# Patient Record
Sex: FEMALE | Race: ASIAN | NOT HISPANIC OR LATINO | Employment: UNEMPLOYED | URBAN - METROPOLITAN AREA
[De-identification: names, ages, dates, MRNs, and addresses within clinical notes are randomized per-mention and may not be internally consistent; named-entity substitution may affect disease eponyms.]

---

## 2020-03-03 ENCOUNTER — OFFICE VISIT (OUTPATIENT)
Dept: RETAIL CLINIC | Facility: CLINIC | Age: 35
End: 2020-03-03

## 2020-03-03 VITALS
TEMPERATURE: 98.1 F | OXYGEN SATURATION: 98 % | RESPIRATION RATE: 14 BRPM | SYSTOLIC BLOOD PRESSURE: 100 MMHG | HEIGHT: 63 IN | BODY MASS INDEX: 22.68 KG/M2 | WEIGHT: 128 LBS | DIASTOLIC BLOOD PRESSURE: 76 MMHG | HEART RATE: 95 BPM

## 2020-03-03 DIAGNOSIS — H10.33 ACUTE BACTERIAL CONJUNCTIVITIS OF BOTH EYES: Primary | ICD-10-CM

## 2020-03-03 PROCEDURE — 99203 OFFICE O/P NEW LOW 30 MIN: CPT | Performed by: NURSE PRACTITIONER

## 2020-03-03 RX ORDER — POLYMYXIN B SULFATE AND TRIMETHOPRIM 1; 10000 MG/ML; [USP'U]/ML
1 SOLUTION OPHTHALMIC EVERY 6 HOURS
Qty: 10 ML | Refills: 0 | Status: SHIPPED | OUTPATIENT
Start: 2020-03-03 | End: 2020-03-10

## 2020-03-03 RX ORDER — AZITHROMYCIN 250 MG/1
250 TABLET, FILM COATED ORAL DAILY
COMMUNITY
Start: 2020-01-03 | End: 2020-03-03

## 2020-03-03 RX ORDER — AZITHROMYCIN 200 MG/5ML
POWDER, FOR SUSPENSION ORAL
COMMUNITY
Start: 2020-02-26 | End: 2020-03-03

## 2020-03-03 NOTE — PROGRESS NOTES
"Jamaal Ibarra is a 34 y.o. female.   Chief Complaint   Patient presents with   • Eye Problem      35 yo female presents with complaint of awaking with red eyes, purulent drainage, and gradually worsening over past 5 days.  She took a zpak that she finished 2 d ago.  She reports having it for an \"emergency\".  Refer to HPI/ROS for additional information.    Eye Problem    Both eyes are affected.This is a new problem. The problem has been gradually worsening. There was no injury mechanism. The patient is experiencing no pain. There is known exposure to pink eye. She does not wear contacts. Associated symptoms include an eye discharge and eye redness. Pertinent negatives include no blurred vision, double vision, fever, foreign body sensation or nausea. Treatments tried: antibiotic. The treatment provided no relief.        The following portions of the patient's history were reviewed and updated as appropriate: allergies, current medications, past family history, past medical history, past social history, past surgical history and problem list.    Current Outpatient Medications:   •  trimethoprim-polymyxin b (POLYTRIM) 98604-6.1 UNIT/ML-% ophthalmic solution, Administer 1 drop to both eyes Every 6 (Six) Hours for 7 days., Disp: 10 mL, Rfl: 0    Review of Systems   Constitutional: Negative.  Negative for fever.   HENT: Negative.    Eyes: Positive for discharge and redness. Negative for blurred vision and double vision.   Respiratory: Negative.    Cardiovascular: Negative.    Gastrointestinal: Negative.  Negative for nausea.   Skin: Negative.    Psychiatric/Behavioral: Negative.      /76 (BP Location: Left arm, Patient Position: Sitting)   Pulse 95   Temp 98.1 °F (36.7 °C) (Oral)   Resp 14   Ht 160 cm (63\")   Wt 58.1 kg (128 lb)   SpO2 98%   BMI 22.67 kg/m²     Objective   No Known Allergies    Physical Exam   Constitutional: She is oriented to person, place, and time.   HENT:   Head: " Normocephalic.   Right Ear: Hearing, tympanic membrane, external ear and ear canal normal.   Left Ear: Hearing, tympanic membrane, external ear and ear canal normal.   Nose: Nose normal. Right sinus exhibits no maxillary sinus tenderness and no frontal sinus tenderness. Left sinus exhibits no maxillary sinus tenderness and no frontal sinus tenderness.   Mouth/Throat: Uvula is midline, oropharynx is clear and moist and mucous membranes are normal. Tonsils are 0 on the right. Tonsils are 0 on the left. No tonsillar exudate.   Eyes: Pupils are equal, round, and reactive to light. EOM and lids are normal. Right conjunctiva is injected. Left conjunctiva is injected.   Neck: Normal range of motion. Neck supple. No JVD present. No tracheal deviation present. No thyromegaly present.   Cardiovascular: Normal rate, regular rhythm and normal heart sounds.   Pulmonary/Chest: Effort normal and breath sounds normal. No stridor. No respiratory distress. She has no wheezes. She has no rales. She exhibits no tenderness.   Lymphadenopathy:     She has no cervical adenopathy.   Neurological: She is alert and oriented to person, place, and time.   Skin: Skin is warm. Capillary refill takes less than 2 seconds.   Psychiatric: She has a normal mood and affect. Her behavior is normal. Judgment and thought content normal.   Vitals reviewed.      Assessment/Plan   Rahel was seen today for eye problem.    Diagnoses and all orders for this visit:    Acute bacterial conjunctivitis of both eyes    Other orders  -     trimethoprim-polymyxin b (POLYTRIM) 16359-7.1 UNIT/ML-% ophthalmic solution; Administer 1 drop to both eyes Every 6 (Six) Hours for 7 days.            An After Visit Summary was printed, reviewed, and given to the patient. Understanding verbalized and agrees with treatment plan.  If no improvement or becomes worse, follow up with primary or go to [facility]/ER.          March 3, 2020 6:42 PM

## 2020-03-03 NOTE — PATIENT INSTRUCTIONS
Bacterial Conjunctivitis, Adult  Bacterial conjunctivitis is an infection of your conjunctiva. This is the clear membrane that covers the white part of your eye and the inner part of your eyelid. This infection can make your eye:  · Red or pink.  · Itchy.  This condition spreads easily from person to person (is contagious) and from one eye to the other eye.  What are the causes?  · This condition is caused by germs (bacteria). You may get the infection if you come into close contact with:  ? A person who has the infection.  ? Items that have germs on them (are contaminated), such as face towels, contact lens solution, or eye makeup.  What increases the risk?  You are more likely to get this condition if you:  · Have contact with people who have the infection.  · Wear contact lenses.  · Have a sinus infection.  · Have had a recent eye injury or surgery.  · Have a weak body defense system (immune system).  · Have dry eyes.  What are the signs or symptoms?    · Thick, yellowish discharge from the eye.  · Tearing or watery eyes.  · Itchy eyes.  · Burning feeling in your eyes.  · Eye redness.  · Swollen eyelids.  · Blurred vision.  How is this treated?    · Antibiotic eye drops or ointment.  · Antibiotic medicine taken by mouth. This is used for infections that do not get better with drops or ointment or that last more than 10 days.  · Cool, wet cloths placed on the eyes.  · Artificial tears used 2-6 times a day.  Follow these instructions at home:  Medicines  · Take or apply your antibiotic medicine as told by your doctor. Do not stop taking or applying the antibiotic even if you start to feel better.  · Take or apply over-the-counter and prescription medicines only as told by your doctor.  · Do not touch your eyelid with the eye-drop bottle or the ointment tube.  Managing discomfort  · Wipe any fluid from your eye with a warm, wet washcloth or a cotton ball.  · Place a clean, cool, wet cloth on your eye. Do this for  10-20 minutes, 3-4 times per day.  General instructions  · Do not wear contacts until the infection is gone. Wear glasses until your doctor says it is okay to wear contacts again.  · Do not wear eye makeup until the infection is gone. Throw away old eye makeup.  · Change or wash your pillowcase every day.  · Do not share towels or washcloths.  · Wash your hands often with soap and water. Use paper towels to dry your hands.  · Do not touch or rub your eyes.  · Do not drive or use heavy machinery if your vision is blurred.  Contact a doctor if:  · You have a fever.  · You do not get better after 10 days.  Get help right away if:  · You have a fever and your symptoms get worse all of a sudden.  · You have very bad pain when you move your eye.  · Your face:  ? Hurts.  ? Is red.  ? Is swollen.  · You have sudden loss of vision.  Summary  · Bacterial conjunctivitis is an infection of your conjunctiva.  · This infection spreads easily from person to person.  · Wash your hands often with soap and water. Use paper towels to dry your hands.  · Take or apply your antibiotic medicine as told by your doctor.  · Contact a doctor if you have a fever or you do not get better after 10 days.  This information is not intended to replace advice given to you by your health care provider. Make sure you discuss any questions you have with your health care provider.  Document Released: 09/26/2009 Document Revised: 07/24/2019 Document Reviewed: 07/24/2019  [employer] Interactive Patient Education © 2020 [employer]

## 2021-11-10 ENCOUNTER — OFFICE VISIT (OUTPATIENT)
Dept: INTERNAL MEDICINE | Facility: CLINIC | Age: 36
End: 2021-11-10

## 2021-11-10 ENCOUNTER — LAB (OUTPATIENT)
Dept: LAB | Facility: HOSPITAL | Age: 36
End: 2021-11-10

## 2021-11-10 VITALS
WEIGHT: 155 LBS | HEIGHT: 63 IN | TEMPERATURE: 97.2 F | HEART RATE: 62 BPM | BODY MASS INDEX: 27.46 KG/M2 | SYSTOLIC BLOOD PRESSURE: 116 MMHG | OXYGEN SATURATION: 99 % | DIASTOLIC BLOOD PRESSURE: 60 MMHG

## 2021-11-10 DIAGNOSIS — R07.89 OTHER CHEST PAIN: ICD-10-CM

## 2021-11-10 DIAGNOSIS — H69.83 EUSTACHIAN TUBE DYSFUNCTION, BILATERAL: ICD-10-CM

## 2021-11-10 DIAGNOSIS — G44.89 OTHER HEADACHE SYNDROME: ICD-10-CM

## 2021-11-10 DIAGNOSIS — R10.9 FLANK PAIN: Primary | ICD-10-CM

## 2021-11-10 DIAGNOSIS — R06.02 SHORTNESS OF BREATH: ICD-10-CM

## 2021-11-10 DIAGNOSIS — R07.89 OTHER CHEST PAIN: Primary | ICD-10-CM

## 2021-11-10 DIAGNOSIS — J30.89 NON-SEASONAL ALLERGIC RHINITIS DUE TO OTHER ALLERGIC TRIGGER: ICD-10-CM

## 2021-11-10 DIAGNOSIS — R31.29 OTHER MICROSCOPIC HEMATURIA: ICD-10-CM

## 2021-11-10 LAB
BASOPHILS # BLD AUTO: 0.06 10*3/MM3 (ref 0–0.2)
BASOPHILS NFR BLD AUTO: 0.8 % (ref 0–1.5)
BILIRUB BLD-MCNC: NEGATIVE MG/DL
CLARITY, POC: CLEAR
COLOR UR: YELLOW
DEPRECATED RDW RBC AUTO: 41.1 FL (ref 37–54)
EOSINOPHIL # BLD AUTO: 0.16 10*3/MM3 (ref 0–0.4)
EOSINOPHIL NFR BLD AUTO: 2 % (ref 0.3–6.2)
ERYTHROCYTE [DISTWIDTH] IN BLOOD BY AUTOMATED COUNT: 13.1 % (ref 12.3–15.4)
EXPIRATION DATE: ABNORMAL
GLUCOSE UR STRIP-MCNC: NEGATIVE MG/DL
HCT VFR BLD AUTO: 37.2 % (ref 34–46.6)
HGB BLD-MCNC: 12.4 G/DL (ref 12–15.9)
IMM GRANULOCYTES # BLD AUTO: 0.02 10*3/MM3 (ref 0–0.05)
IMM GRANULOCYTES NFR BLD AUTO: 0.3 % (ref 0–0.5)
KETONES UR QL: NEGATIVE
LEUKOCYTE EST, POC: NEGATIVE
LYMPHOCYTES # BLD AUTO: 2.13 10*3/MM3 (ref 0.7–3.1)
LYMPHOCYTES NFR BLD AUTO: 26.8 % (ref 19.6–45.3)
Lab: ABNORMAL
MCH RBC QN AUTO: 28.5 PG (ref 26.6–33)
MCHC RBC AUTO-ENTMCNC: 33.3 G/DL (ref 31.5–35.7)
MCV RBC AUTO: 85.5 FL (ref 79–97)
MONOCYTES # BLD AUTO: 0.75 10*3/MM3 (ref 0.1–0.9)
MONOCYTES NFR BLD AUTO: 9.4 % (ref 5–12)
NEUTROPHILS NFR BLD AUTO: 4.83 10*3/MM3 (ref 1.7–7)
NEUTROPHILS NFR BLD AUTO: 60.7 % (ref 42.7–76)
NITRITE UR-MCNC: NEGATIVE MG/ML
NRBC BLD AUTO-RTO: 0 /100 WBC (ref 0–0.2)
PH UR: 6 [PH] (ref 5–8)
PLATELET # BLD AUTO: 380 10*3/MM3 (ref 140–450)
PMV BLD AUTO: 10.5 FL (ref 6–12)
PROT UR STRIP-MCNC: NEGATIVE MG/DL
RBC # BLD AUTO: 4.35 10*6/MM3 (ref 3.77–5.28)
RBC # UR STRIP: ABNORMAL /UL
SP GR UR: 1.01 (ref 1–1.03)
UROBILINOGEN UR QL: NORMAL
WBC # BLD AUTO: 7.95 10*3/MM3 (ref 3.4–10.8)

## 2021-11-10 PROCEDURE — 99214 OFFICE O/P EST MOD 30 MIN: CPT | Performed by: NURSE PRACTITIONER

## 2021-11-10 PROCEDURE — 85025 COMPLETE CBC W/AUTO DIFF WBC: CPT

## 2021-11-10 PROCEDURE — 36415 COLL VENOUS BLD VENIPUNCTURE: CPT

## 2021-11-10 PROCEDURE — 80053 COMPREHEN METABOLIC PANEL: CPT | Performed by: NURSE PRACTITIONER

## 2021-11-10 RX ORDER — LORATADINE 10 MG/1
10 TABLET ORAL DAILY
Qty: 30 TABLET | Refills: 3 | Status: SHIPPED | OUTPATIENT
Start: 2021-11-10 | End: 2022-09-26

## 2021-11-10 NOTE — PROGRESS NOTES
"Subjective   Chief Complaint   Patient presents with   • Establish Care     right abdominal pain       Rahel Ibarra is a 36 y.o. female here today as a new patient to establish care.  Has not had a PCP.   Pt complains of pain on her right side.  The pain is intermittent.  She feels the pain every night.   The pain started about a month ago.   Finished her period yesterday.  Denies any nausea, vomiting or diarrhea.  At this time pt complains of a \"tightness\" in her side.  Pt also complains of headaches.  These occur every 2-3 days.  Tylenol helps the symptoms.  Hasn't had an eye exam for a few years.  Requesting that her ears be checked.  \"Feel full\" and popping  Has been having chest pain with activity.  Feels SOB at times with working or running.  This has occurred 3-4 times the past 3 months or so.  Lasts about 20-30 minutes.  Denies any chest pain at this time.  Last episode 3-4 days ago.  Started after she had her son about 3 months ago.    Pt is breast feeding.         Review of Systems   Constitutional: Negative for activity change, appetite change and fatigue.   HENT: Positive for ear pain (bilateral). Negative for congestion.    Respiratory: Positive for shortness of breath. Negative for cough.    Cardiovascular: Positive for chest pain. Negative for leg swelling.   Gastrointestinal: Positive for abdominal pain (Right side).   Neurological: Positive for headache. Negative for dizziness, weakness and confusion.   Psychiatric/Behavioral: Negative for behavioral problems and decreased concentration.       History reviewed. No pertinent past medical history.  History reviewed. No pertinent surgical history.  Family History   Problem Relation Age of Onset   • Heart disease Mother    • No Known Problems Father      Social History     Tobacco Use   Smoking Status Never Smoker   Smokeless Tobacco Never Used      Social History     Substance and Sexual Activity   Alcohol Use Not Currently      No current outpatient " "medications on file prior to visit.     No current facility-administered medications on file prior to visit.     No Known Allergies    Objective   Vitals:    11/10/21 1504   BP: 116/60   Pulse: 62   Temp: 97.2 °F (36.2 °C)   TempSrc: Temporal   SpO2: 99%   Weight: 70.3 kg (155 lb)   Height: 160 cm (63\")     Body mass index is 27.46 kg/m².    Physical Exam  Vitals and nursing note reviewed.   HENT:      Head: Normocephalic.      Right Ear: Hearing, ear canal and external ear normal.      Left Ear: Hearing, ear canal and external ear normal.      Ears:      Comments: Both TMs dull with fluid behind them, no redness  Eyes:      Pupils: Pupils are equal, round, and reactive to light.   Cardiovascular:      Rate and Rhythm: Normal rate and regular rhythm.      Pulses: Normal pulses.      Heart sounds: Normal heart sounds. No murmur heard.      Pulmonary:      Effort: Pulmonary effort is normal.      Breath sounds: Normal breath sounds. No wheezing, rhonchi or rales.   Abdominal:      General: Bowel sounds are normal. There is no distension.      Palpations: Abdomen is soft.      Tenderness: There is no abdominal tenderness. There is no right CVA tenderness or left CVA tenderness.   Skin:     General: Skin is warm and dry.      Capillary Refill: Capillary refill takes less than 2 seconds.   Neurological:      General: No focal deficit present.      Mental Status: She is alert and oriented to person, place, and time.      Gait: Gait is intact.   Psychiatric:         Attention and Perception: Attention normal.         Mood and Affect: Mood normal.         Behavior: Behavior normal.              Assessment/Plan   Problem List Items Addressed This Visit     None      Visit Diagnoses     Flank pain    -  Primary    Relevant Orders    POCT urinalysis dipstick, automated (Completed)    US Renal Limited    Other headache syndrome        Non-seasonal allergic rhinitis due to other allergic trigger        Relevant Medications    " loratadine (Claritin) 10 MG tablet    Eustachian tube dysfunction, bilateral        Other chest pain        Relevant Orders    CBC w AUTO Differential    Comprehensive Metabolic Panel    Other microscopic hematuria        Relevant Orders    US Renal Limited    Shortness of breath        Relevant Orders    D-dimer, Quantitative         Loratadine for allergies  Schedule U/S to look for renal colic  Denies CP today  Labs, d-dimer  Recommend eye exam for headaches    Current Outpatient Medications:   •  loratadine (Claritin) 10 MG tablet, Take 1 tablet by mouth Daily., Disp: 30 tablet, Rfl: 3       Plan of care reviewed with the patient at the conclusion of today's visit.  Education was provided regarding diagnosis, management, and any prescribed or recommended OTC medications.  Patient verbalized understanding of and agreement with management plan.     Return in about 4 weeks (around 12/8/2021) for Recheck allergies, side pain, CP.        TRES Borrero

## 2021-11-11 LAB
ALBUMIN SERPL-MCNC: 4.8 G/DL (ref 3.5–5.2)
ALBUMIN/GLOB SERPL: 2 G/DL
ALP SERPL-CCNC: 96 U/L (ref 39–117)
ALT SERPL W P-5'-P-CCNC: 7 U/L (ref 1–33)
ANION GAP SERPL CALCULATED.3IONS-SCNC: 8.1 MMOL/L (ref 5–15)
AST SERPL-CCNC: 13 U/L (ref 1–32)
BILIRUB SERPL-MCNC: <0.2 MG/DL (ref 0–1.2)
BUN SERPL-MCNC: 10 MG/DL (ref 6–20)
BUN/CREAT SERPL: 15.6 (ref 7–25)
CALCIUM SPEC-SCNC: 9.6 MG/DL (ref 8.6–10.5)
CHLORIDE SERPL-SCNC: 106 MMOL/L (ref 98–107)
CO2 SERPL-SCNC: 24.9 MMOL/L (ref 22–29)
CREAT SERPL-MCNC: 0.64 MG/DL (ref 0.57–1)
GFR SERPL CREATININE-BSD FRML MDRD: 106 ML/MIN/1.73
GLOBULIN UR ELPH-MCNC: 2.4 GM/DL
GLUCOSE SERPL-MCNC: 95 MG/DL (ref 65–99)
POTASSIUM SERPL-SCNC: 4.3 MMOL/L (ref 3.5–5.2)
PROT SERPL-MCNC: 7.2 G/DL (ref 6–8.5)
SODIUM SERPL-SCNC: 139 MMOL/L (ref 136–145)

## 2021-11-22 ENCOUNTER — HOSPITAL ENCOUNTER (OUTPATIENT)
Dept: ULTRASOUND IMAGING | Facility: HOSPITAL | Age: 36
Discharge: HOME | End: 2021-11-22
Admitting: NURSE PRACTITIONER

## 2021-11-22 DIAGNOSIS — R31.29 OTHER MICROSCOPIC HEMATURIA: ICD-10-CM

## 2021-11-22 DIAGNOSIS — R10.9 FLANK PAIN: ICD-10-CM

## 2021-11-22 PROCEDURE — 76775 US EXAM ABDO BACK WALL LIM: CPT

## 2021-11-29 DIAGNOSIS — R10.30 LOWER ABDOMINAL PAIN: Primary | ICD-10-CM

## 2021-12-08 ENCOUNTER — HOSPITAL ENCOUNTER (OUTPATIENT)
Dept: ULTRASOUND IMAGING | Facility: HOSPITAL | Age: 36
Discharge: HOME | End: 2021-12-08
Admitting: NURSE PRACTITIONER

## 2021-12-08 DIAGNOSIS — R10.30 LOWER ABDOMINAL PAIN: ICD-10-CM

## 2021-12-08 PROCEDURE — 76830 TRANSVAGINAL US NON-OB: CPT

## 2021-12-13 DIAGNOSIS — D25.9 UTERINE LEIOMYOMA, UNSPECIFIED LOCATION: Primary | ICD-10-CM

## 2021-12-13 DIAGNOSIS — N83.299 FUNCTIONAL OVARIAN CYSTS: ICD-10-CM

## 2021-12-16 ENCOUNTER — OFFICE VISIT (OUTPATIENT)
Dept: INTERNAL MEDICINE | Facility: CLINIC | Age: 36
End: 2021-12-16

## 2021-12-16 VITALS
BODY MASS INDEX: 27 KG/M2 | WEIGHT: 152.4 LBS | OXYGEN SATURATION: 97 % | TEMPERATURE: 97.3 F | HEART RATE: 97 BPM | DIASTOLIC BLOOD PRESSURE: 64 MMHG | HEIGHT: 63 IN | SYSTOLIC BLOOD PRESSURE: 122 MMHG

## 2021-12-16 DIAGNOSIS — G89.29 CHRONIC RIGHT SHOULDER PAIN: ICD-10-CM

## 2021-12-16 DIAGNOSIS — R10.9 FLANK PAIN: Primary | ICD-10-CM

## 2021-12-16 DIAGNOSIS — M25.511 CHRONIC RIGHT SHOULDER PAIN: ICD-10-CM

## 2021-12-16 DIAGNOSIS — M79.621 PAIN IN RIGHT UPPER ARM: ICD-10-CM

## 2021-12-16 PROCEDURE — 99214 OFFICE O/P EST MOD 30 MIN: CPT | Performed by: NURSE PRACTITIONER

## 2021-12-16 NOTE — PROGRESS NOTES
"Chief Complaint   Patient presents with   • Flank Pain     f/u        HPI  Rahel Ibarra is a 36 y.o. female presents for follow-up on flank pain.  She states the pain has resolved.  She had a negative renal U/S but a uterine fibroid was detected.  She has been referred to a gynecologist and has an upcoming appt.  Pt does complain of right shoulder pain.  This started last week.  She used topical analgesics last week and the pain resolved.  She denies any pain at this time.  She denies having any swelling of the shoulder or upper arm.  She states the upper arm feels \"very heavy\" when she has the pain.  Denies this feeling at this time.  She states she had similar pain prior to delivering to have her baby.    The following portions of the patient's history were reviewed and updated as appropriate: allergies, current medications, past family history, past medical history, past social history, past surgical history and problem list.    Subjective  Review of Systems   Constitutional: Negative for activity change, appetite change and fatigue.   HENT: Negative for congestion.    Respiratory: Negative for cough and shortness of breath.    Cardiovascular: Negative for chest pain and leg swelling.   Gastrointestinal: Negative for abdominal pain.   Musculoskeletal: Positive for arthralgias (Right upper arm/shoulder).   Neurological: Negative for dizziness, weakness and confusion.   Psychiatric/Behavioral: Negative for behavioral problems and decreased concentration.       Objective  Visit Vitals  /64   Pulse 97   Temp 97.3 °F (36.3 °C) (Temporal)   Ht 160 cm (63\")   Wt 69.1 kg (152 lb 6.4 oz)   LMP 12/07/2021   SpO2 97%   BMI 27.00 kg/m²        Physical Exam  Vitals and nursing note reviewed.   HENT:      Head: Normocephalic.   Eyes:      Pupils: Pupils are equal, round, and reactive to light.   Cardiovascular:      Rate and Rhythm: Normal rate and regular rhythm.      Pulses: Normal pulses.      Heart sounds: Normal " heart sounds.   Pulmonary:      Effort: Pulmonary effort is normal.      Breath sounds: Normal breath sounds.   Musculoskeletal:      Right shoulder: Normal.   Skin:     General: Skin is warm and dry.      Capillary Refill: Capillary refill takes less than 2 seconds.   Neurological:      General: No focal deficit present.      Mental Status: She is alert and oriented to person, place, and time.      Gait: Gait is intact.   Psychiatric:         Attention and Perception: Attention normal.         Mood and Affect: Mood normal.         Behavior: Behavior normal.          Procedures      Assessment and Plan  Diagnoses and all orders for this visit:    1. Flank pain (Primary)    2. Chronic right shoulder pain  -     XR Shoulder 2+ View Right; Future    3. Pain in right upper arm  -     XR Humerus Right; Future    flank pain has resolved  Denies pain at this time  XR R shoulder/humerus per pt request    Return if symptoms worsen or fail to improve.          TRES Borrero

## 2022-02-20 PROBLEM — Z01.419 WELL WOMAN EXAM: Status: ACTIVE | Noted: 2022-02-20

## 2022-02-24 ENCOUNTER — OFFICE VISIT (OUTPATIENT)
Dept: OBSTETRICS AND GYNECOLOGY | Facility: CLINIC | Age: 37
End: 2022-02-24

## 2022-02-24 VITALS — WEIGHT: 156 LBS | RESPIRATION RATE: 14 BRPM | BODY MASS INDEX: 27.63 KG/M2

## 2022-02-24 DIAGNOSIS — D25.2 SUBSEROUS LEIOMYOMA OF UTERUS: Primary | ICD-10-CM

## 2022-02-24 DIAGNOSIS — N92.6 IRREGULAR MENSES: ICD-10-CM

## 2022-02-24 PROCEDURE — 99203 OFFICE O/P NEW LOW 30 MIN: CPT | Performed by: OBSTETRICS & GYNECOLOGY

## 2022-02-24 RX ORDER — NORETHINDRONE ACETATE AND ETHINYL ESTRADIOL 1MG-20(21)
1 KIT ORAL DAILY
Qty: 28 TABLET | Refills: 5 | Status: SHIPPED | OUTPATIENT
Start: 2022-02-24 | End: 2022-08-21 | Stop reason: SDUPTHER

## 2022-02-24 NOTE — PATIENT INSTRUCTIONS
Uterine Fibroids    Uterine fibroids are lumps of tissue (tumors) in your womb (uterus). They are not cancer (are benign).  Most women with this condition do not need treatment. Sometimes fibroids can affect your ability to have children (your fertility). If that happens, you may need surgery to take out the fibroids.  Follow these instructions at home:  · Take over-the-counter and prescription medicines only as told by your doctor. Your doctor may suggest NSAIDs (such as aspirin or ibuprofen) to help with pain.  · Ask your doctor if you should:  ? Take iron pills.  ? Eat more foods that have iron in them, such as dark green, leafy vegetables.  · If directed, apply heat to your back or belly to reduce pain. Use the heat source that your doctor recommends, such as a moist heat pack or a heating pad.  ? Put a towel between your skin and the heat source.  ? Leave the heat on for 20-30 minutes.  ? Remove the heat if your skin turns bright red. This is especially important if you are unable to feel pain, heat, or cold. You may have a greater risk of getting burned.  · Pay close attention to your period (menstrual) cycles. Tell your doctor about any changes, such as:  ? A heavier blood flow than usual.  ? Needing to use more pads or tampons than normal.  ? A change in how many days your period lasts.  ? A change in symptoms that come with your period, such as cramps or back pain.  · Keep all follow-up visits as told by your doctor. This is important. Your doctor may need to watch your fibroids over time for any changes.  Contact a doctor if you:  · Have pain that does not get better with medicine or heat, such as pain or cramps in:  ? Your back.  ? The area between your hip bones (pelvic area).  ? Your belly.  · Have new bleeding between your periods.  · Have more bleeding during or between your periods.  · Feel very tired or weak.  · Feel light-headed.  Get help right away if you:  · Pass out (faint).  · Have pain in the  area between your hip bones that suddenly gets worse.  · Have bleeding that soaks a tampon or pad in 30 minutes or less.  Summary  · Uterine fibroids are lumps of tissue (tumors) in your womb (uterus). They are not cancer.  · The only treatment that most women need is taking aspirin or ibuprofen for pain.  · Contact a doctor if you have pain or cramps that do not get better with medicine.  · Make sure you know what symptoms you should get help for right away.  This information is not intended to replace advice given to you by your health care provider. Make sure you discuss any questions you have with your health care provider.  Document Revised: 11/30/2018 Document Reviewed: 11/13/2018  [employer] Patient Education © 2021 [employer] Inc.

## 2022-02-24 NOTE — PROGRESS NOTES
Subjective   Chief Complaint   Patient presents with   • Gynecologic Exam       Rahel Ibarra is a 36 y.o. year old .  Patient's last menstrual period was 2022 (approximate).  She comes in as a new patient referred by her primary care.  She comes in because of a finding of an ovarian cyst at a prior ultrasound.  She was having right-sided pain.  Because of this the primary care ordered an ultrasound.  Ultrasound images are available for review.  I reviewed the images.  Ovaries are actually normal.  She has a calcified fibroid at the fundus.  The endometrium does not appear to be disturbed.  She has been having irregular menses.  It is somewhat bothersome to her.  She is sexually active is in and using no reliable forms of contraception.  She never has used birth control because concerns about weight gain.    She believes she had a Pap smear done last year when she was being cared for at the [facility] around the time of the delivery of her third child.  This was a  section.    The following portions of the patient's history were reviewed and updated as appropriate:current medications, allergies, past family history, past medical history, past social history and past surgical history    Social History    Tobacco Use      Smoking status: Never Smoker      Smokeless tobacco: Never Used         Objective   Resp 14   Wt 70.8 kg (156 lb)   LMP 2022 (Approximate)   Breastfeeding No   BMI 27.63 kg/m²     Lab Review   No data reviewed    Imaging   See HPI       Assessment   1. Irregular menses  2. Intramural/subserosal fibroid without cavitary extension     Plan   1. She was instructed how to start her birth control.  It should be started on  following the onset of her next cycle.  Because it may take 1 month to become effective, the use of alternative contraception for one month was stressed.  The potential for breakthrough bleeding for up to 4 cycles was also  emphasized.  2. Ultrasound needs to be done in 3 months.   3. The following data needs to be obtained to update her medical records: last PAP and operative report from her C/S.  4. The importance of keeping all planned follow-up and taking all medications as prescribed was emphasized.  5. Follow up for recheck of fibroid 3 months    New Medications Ordered This Visit   Medications   • norethindrone-ethinyl estradiol FE (Junel FE 1/20) 1-20 MG-MCG per tablet     Sig: Take 1 tablet by mouth Daily.     Dispense:  28 tablet     Refill:  5          This note was electronically signed.    Jewel Gutierrez M.D.  February 24, 2022    Total time spent today with Rahel  was 30-44 minutes (level 3) - pathophysiology of her presenting problem along with plans for any diagnositc work-up and treatment.    Part of this note may be an electronic transcription/translation of spoken language to printed text using the Dragon Dictation System.

## 2022-02-25 ENCOUNTER — TELEPHONE (OUTPATIENT)
Dept: INTERNAL MEDICINE | Facility: CLINIC | Age: 37
End: 2022-02-25

## 2022-02-25 NOTE — TELEPHONE ENCOUNTER
Patient called complaining of left sided chest pain radiating into her left arm, advised patient to go to ER, patient wanted to get in with a cardiologist today, advised her that was not possible and she stated she would then go to the ER

## 2022-08-22 RX ORDER — NORETHINDRONE ACETATE AND ETHINYL ESTRADIOL 1MG-20(21)
1 KIT ORAL DAILY
Qty: 28 TABLET | Refills: 2 | Status: SHIPPED | OUTPATIENT
Start: 2022-08-22 | End: 2022-10-11

## 2022-08-29 ENCOUNTER — LAB (OUTPATIENT)
Dept: LAB | Facility: HOSPITAL | Age: 37
End: 2022-08-29

## 2022-08-29 ENCOUNTER — OFFICE VISIT (OUTPATIENT)
Dept: INTERNAL MEDICINE | Facility: CLINIC | Age: 37
End: 2022-08-29

## 2022-08-29 VITALS
DIASTOLIC BLOOD PRESSURE: 78 MMHG | WEIGHT: 155 LBS | SYSTOLIC BLOOD PRESSURE: 122 MMHG | HEART RATE: 68 BPM | HEIGHT: 64 IN | TEMPERATURE: 97.1 F | OXYGEN SATURATION: 99 % | BODY MASS INDEX: 26.46 KG/M2

## 2022-08-29 DIAGNOSIS — R00.2 PALPITATIONS: ICD-10-CM

## 2022-08-29 DIAGNOSIS — R07.89 INTERMITTENT LEFT-SIDED CHEST PAIN: Primary | ICD-10-CM

## 2022-08-29 DIAGNOSIS — Z82.49 FAMILY HISTORY OF HEART DISEASE: ICD-10-CM

## 2022-08-29 LAB
BASOPHILS # BLD AUTO: 0.07 10*3/MM3 (ref 0–0.2)
BASOPHILS NFR BLD AUTO: 1.1 % (ref 0–1.5)
D DIMER PPP FEU-MCNC: <0.27 MCGFEU/ML (ref 0.01–0.5)
DEPRECATED RDW RBC AUTO: 41.5 FL (ref 37–54)
EOSINOPHIL # BLD AUTO: 0.46 10*3/MM3 (ref 0–0.4)
EOSINOPHIL NFR BLD AUTO: 7.1 % (ref 0.3–6.2)
ERYTHROCYTE [DISTWIDTH] IN BLOOD BY AUTOMATED COUNT: 13.4 % (ref 12.3–15.4)
HCT VFR BLD AUTO: 41.4 % (ref 34–46.6)
HGB BLD-MCNC: 13.3 G/DL (ref 12–15.9)
IMM GRANULOCYTES # BLD AUTO: 0.02 10*3/MM3 (ref 0–0.05)
IMM GRANULOCYTES NFR BLD AUTO: 0.3 % (ref 0–0.5)
LYMPHOCYTES # BLD AUTO: 2.05 10*3/MM3 (ref 0.7–3.1)
LYMPHOCYTES NFR BLD AUTO: 31.6 % (ref 19.6–45.3)
MCH RBC QN AUTO: 27.1 PG (ref 26.6–33)
MCHC RBC AUTO-ENTMCNC: 32.1 G/DL (ref 31.5–35.7)
MCV RBC AUTO: 84.5 FL (ref 79–97)
MONOCYTES # BLD AUTO: 0.63 10*3/MM3 (ref 0.1–0.9)
MONOCYTES NFR BLD AUTO: 9.7 % (ref 5–12)
NEUTROPHILS NFR BLD AUTO: 3.25 10*3/MM3 (ref 1.7–7)
NEUTROPHILS NFR BLD AUTO: 50.2 % (ref 42.7–76)
NRBC BLD AUTO-RTO: 0 /100 WBC (ref 0–0.2)
PLATELET # BLD AUTO: 386 10*3/MM3 (ref 140–450)
PMV BLD AUTO: 10.4 FL (ref 6–12)
RBC # BLD AUTO: 4.9 10*6/MM3 (ref 3.77–5.28)
WBC NRBC COR # BLD: 6.48 10*3/MM3 (ref 3.4–10.8)

## 2022-08-29 PROCEDURE — 85025 COMPLETE CBC W/AUTO DIFF WBC: CPT | Performed by: NURSE PRACTITIONER

## 2022-08-29 PROCEDURE — 99214 OFFICE O/P EST MOD 30 MIN: CPT | Performed by: NURSE PRACTITIONER

## 2022-08-29 PROCEDURE — 85379 FIBRIN DEGRADATION QUANT: CPT | Performed by: NURSE PRACTITIONER

## 2022-08-29 PROCEDURE — 84443 ASSAY THYROID STIM HORMONE: CPT | Performed by: NURSE PRACTITIONER

## 2022-08-29 PROCEDURE — 36415 COLL VENOUS BLD VENIPUNCTURE: CPT | Performed by: NURSE PRACTITIONER

## 2022-08-29 PROCEDURE — 80053 COMPREHEN METABOLIC PANEL: CPT | Performed by: NURSE PRACTITIONER

## 2022-08-29 PROCEDURE — 93000 ELECTROCARDIOGRAM COMPLETE: CPT | Performed by: NURSE PRACTITIONER

## 2022-08-29 NOTE — PROGRESS NOTES
"Chief Complaint   Patient presents with   • Chest Pain       HPI  Rahel Ibarra is a 37 y.o. female presents for intermittent left side chest pain.  This has been occurring for a couple months.  Her last episode was about 3 weeks ago.  The pain usually last a few hours when it occurs.  Occurs at random times (not always exertional).  She has shortness of breath when the pain occurs.  She describes the pain as a tightness with occasional sharp pain and radiates to the left arm.  She states her arm gets heavy.  She denies feeling anxious.  Her mother  of a sudden heart attack in her early 50s.  She denies any diaphoresis or nausea.    No symptoms today.    The following portions of the patient's history were reviewed and updated as appropriate: allergies, current medications, past family history, past medical history, past social history, past surgical history and problem list.    Subjective  Review of Systems   Constitutional: Negative for activity change, appetite change and fatigue.   HENT: Negative for congestion.    Respiratory: Positive for chest tightness. Negative for cough and shortness of breath.    Cardiovascular: Positive for chest pain and palpitations. Negative for leg swelling.   Gastrointestinal: Negative for abdominal pain.   Neurological: Negative for dizziness, weakness and confusion.   Psychiatric/Behavioral: Negative for behavioral problems and decreased concentration.       Objective  Visit Vitals  /78 (BP Location: Left arm, Patient Position: Sitting)   Pulse 68   Temp 97.1 °F (36.2 °C)   Ht 162.6 cm (64\")   Wt 70.3 kg (155 lb)   SpO2 99%   BMI 26.61 kg/m²        Physical Exam  Vitals and nursing note reviewed.   HENT:      Head: Normocephalic.   Eyes:      Pupils: Pupils are equal, round, and reactive to light.   Cardiovascular:      Rate and Rhythm: Normal rate and regular rhythm.      Pulses: Normal pulses.      Heart sounds: Normal heart sounds. No murmur heard.    No friction rub. "   Pulmonary:      Effort: Pulmonary effort is normal.      Breath sounds: Normal breath sounds.   Skin:     General: Skin is warm and dry.      Capillary Refill: Capillary refill takes less than 2 seconds.   Neurological:      General: No focal deficit present.      Mental Status: She is alert and oriented to person, place, and time.      Gait: Gait is intact.   Psychiatric:         Attention and Perception: Attention normal.         Mood and Affect: Mood normal.         Behavior: Behavior normal.      .[facility]      ECG 12 Lead    Date/Time: 8/29/2022 11:40 AM  Performed by: Kuldip Beltran APRN  Authorized by: Kuldip Beltran APRN   Comparison: not compared with previous ECG   Previous ECG: no previous ECG available  Rhythm: sinus rhythm and sinus arrhythmia  Rate: normal  Conduction: conduction normal  ST Segments: ST segments normal  T Waves: T waves normal  QRS axis: normal  Other: no other findings    Clinical impression: normal ECG             Assessment and Plan  Diagnoses and all orders for this visit:    1. Intermittent left-sided chest pain (Primary)  -     CBC w AUTO Differential; Future  -     Comprehensive Metabolic Panel  -     D-dimer, Quantitative  -     TSH; Future  -     CBC w AUTO Differential  -     Ambulatory Referral to Cardiology    2. Family history of heart disease  -     Ambulatory Referral to Cardiology    3. Palpitations  -     TSH  -     Ambulatory Referral to Cardiology    Other orders  -     SCANNED EKG    No symptoms x3 weeks  Baseline EKG done - normal  Labs today, check d-dimer  Refer to cardio due to family hx     Return in about 4 weeks (around 9/26/2022) for Reschedule physical.        TRES Borrero

## 2022-08-30 LAB
ALBUMIN SERPL-MCNC: 4.6 G/DL (ref 3.5–5.2)
ALBUMIN/GLOB SERPL: 1.8 G/DL
ALP SERPL-CCNC: 99 U/L (ref 39–117)
ALT SERPL W P-5'-P-CCNC: 9 U/L (ref 1–33)
ANION GAP SERPL CALCULATED.3IONS-SCNC: 10.9 MMOL/L (ref 5–15)
AST SERPL-CCNC: 16 U/L (ref 1–32)
BILIRUB SERPL-MCNC: <0.2 MG/DL (ref 0–1.2)
BUN SERPL-MCNC: 9 MG/DL (ref 6–20)
BUN/CREAT SERPL: 14.5 (ref 7–25)
CALCIUM SPEC-SCNC: 9.8 MG/DL (ref 8.6–10.5)
CHLORIDE SERPL-SCNC: 106 MMOL/L (ref 98–107)
CO2 SERPL-SCNC: 20.1 MMOL/L (ref 22–29)
CREAT SERPL-MCNC: 0.62 MG/DL (ref 0.57–1)
EGFRCR SERPLBLD CKD-EPI 2021: 119.3 ML/MIN/1.73
GLOBULIN UR ELPH-MCNC: 2.5 GM/DL
GLUCOSE SERPL-MCNC: 86 MG/DL (ref 65–99)
POTASSIUM SERPL-SCNC: 4.3 MMOL/L (ref 3.5–5.2)
PROT SERPL-MCNC: 7.1 G/DL (ref 6–8.5)
SODIUM SERPL-SCNC: 137 MMOL/L (ref 136–145)
TSH SERPL DL<=0.05 MIU/L-ACNC: 0.38 UIU/ML (ref 0.27–4.2)

## 2022-09-26 ENCOUNTER — OFFICE VISIT (OUTPATIENT)
Dept: INTERNAL MEDICINE | Facility: CLINIC | Age: 37
End: 2022-09-26

## 2022-09-26 ENCOUNTER — LAB (OUTPATIENT)
Dept: LAB | Facility: HOSPITAL | Age: 37
End: 2022-09-26

## 2022-09-26 VITALS
HEIGHT: 64 IN | HEART RATE: 69 BPM | TEMPERATURE: 97.6 F | DIASTOLIC BLOOD PRESSURE: 78 MMHG | WEIGHT: 151 LBS | SYSTOLIC BLOOD PRESSURE: 120 MMHG | BODY MASS INDEX: 25.78 KG/M2 | OXYGEN SATURATION: 99 %

## 2022-09-26 DIAGNOSIS — R07.89 INTERMITTENT LEFT-SIDED CHEST PAIN: ICD-10-CM

## 2022-09-26 DIAGNOSIS — R94.6 ABNORMAL THYROID FUNCTION TEST: ICD-10-CM

## 2022-09-26 DIAGNOSIS — Z00.00 ROUTINE PHYSICAL EXAMINATION: Primary | ICD-10-CM

## 2022-09-26 PROCEDURE — 86376 MICROSOMAL ANTIBODY EACH: CPT | Performed by: NURSE PRACTITIONER

## 2022-09-26 PROCEDURE — 99395 PREV VISIT EST AGE 18-39: CPT | Performed by: NURSE PRACTITIONER

## 2022-09-26 PROCEDURE — 36415 COLL VENOUS BLD VENIPUNCTURE: CPT | Performed by: NURSE PRACTITIONER

## 2022-09-26 PROCEDURE — 84443 ASSAY THYROID STIM HORMONE: CPT | Performed by: NURSE PRACTITIONER

## 2022-09-26 PROCEDURE — 84481 FREE ASSAY (FT-3): CPT | Performed by: NURSE PRACTITIONER

## 2022-09-26 PROCEDURE — 3008F BODY MASS INDEX DOCD: CPT | Performed by: NURSE PRACTITIONER

## 2022-09-26 PROCEDURE — 84439 ASSAY OF FREE THYROXINE: CPT | Performed by: NURSE PRACTITIONER

## 2022-09-26 PROCEDURE — 86800 THYROGLOBULIN ANTIBODY: CPT | Performed by: NURSE PRACTITIONER

## 2022-09-26 PROCEDURE — 2014F MENTAL STATUS ASSESS: CPT | Performed by: NURSE PRACTITIONER

## 2022-09-26 PROCEDURE — 80061 LIPID PANEL: CPT | Performed by: NURSE PRACTITIONER

## 2022-09-26 NOTE — PROGRESS NOTES
Subjective   Chief Complaint   Patient presents with   • Follow-up   • Overactive thyroid       Rahel Ibarra is a 37 y.o. female here today for annual exam.  Still having episodes of CP, sees cardiology tomorrow, last episode was last night    Overall healthy: Yes  Regular exercise:  No  Diet is well balanced:  Yes  Vitamin Supplement:  Yes  Alcohol intake:  No   Tobacco use:  No    Cardiovascular risk is low:  Yes   Menstrual cycle regular:  No  PAP:  UTD per pt  Concern for STDs:  No  Mammogram:  N/A  Last colon screening:  N/A  Regular dental exam:  No   Regular eye exam:  No  Immunizations up to date:  Yes  Wear a seatbelt regularly:  Yes  Wear sunscreen regularly when outdoors:  Sometimes    Review of Systems   Constitutional: Negative for activity change, appetite change and fatigue.   HENT: Negative for congestion.    Respiratory: Negative for cough and shortness of breath.    Cardiovascular: Positive for chest pain. Negative for leg swelling.   Gastrointestinal: Negative for abdominal pain.   Neurological: Negative for dizziness, weakness and confusion.   Psychiatric/Behavioral: Negative for behavioral problems and decreased concentration.       The following portions of the patient's history were reviewed and updated as appropriate: allergies, current medications, past family history, past medical history, past social history, past surgical history and problem list.    History reviewed. No pertinent past medical history.  Past Surgical History:   Procedure Laterality Date   •  SECTION  2021     Family History   Problem Relation Age of Onset   • Heart disease Mother    • No Known Problems Father      Social History     Tobacco Use   Smoking Status Never Smoker   Smokeless Tobacco Never Used      Social History     Substance and Sexual Activity   Alcohol Use Not Currently      No Known Allergies    Current Outpatient Medications on File Prior to Visit   Medication Sig   • norethindrone-ethinyl  "estradiol FE (Junel FE 1/20) 1-20 MG-MCG per tablet Take 1 tablet by mouth Daily.   • [DISCONTINUED] loratadine (Claritin) 10 MG tablet Take 1 tablet by mouth Daily.     No current facility-administered medications on file prior to visit.       Objective   Vitals:    09/26/22 1121   BP: 120/78   BP Location: Left arm   Patient Position: Sitting   Pulse: 69   Temp: 97.6 °F (36.4 °C)   SpO2: 99%   Weight: 68.5 kg (151 lb)   Height: 162.6 cm (64\")     Body mass index is 25.92 kg/m².    Physical Exam  Vitals and nursing note reviewed.   HENT:      Head: Normocephalic.      Right Ear: External ear normal.      Left Ear: External ear normal.      Ears:      Comments: Both TMs dull, dry skin in canals     Mouth/Throat:      Mouth: Mucous membranes are moist.      Pharynx: Oropharynx is clear.   Eyes:      Extraocular Movements: Extraocular movements intact.      Conjunctiva/sclera: Conjunctivae normal.      Pupils: Pupils are equal, round, and reactive to light.   Cardiovascular:      Rate and Rhythm: Normal rate and regular rhythm.      Pulses: Normal pulses.           Carotid pulses are 2+ on the right side and 2+ on the left side.     Heart sounds: Normal heart sounds. No murmur heard.  Pulmonary:      Effort: Pulmonary effort is normal.      Breath sounds: Normal breath sounds.   Abdominal:      General: Bowel sounds are normal. There is no distension.      Palpations: Abdomen is soft.      Tenderness: There is no abdominal tenderness.   Musculoskeletal:      Cervical back: Normal.      Thoracic back: Normal.      Lumbar back: Normal.      Right lower leg: No edema.      Left lower leg: No edema.      Comments: Normal ROM of all major joints   Skin:     General: Skin is warm and dry.      Capillary Refill: Capillary refill takes less than 2 seconds.   Neurological:      General: No focal deficit present.      Mental Status: She is alert and oriented to person, place, and time.      GCS: GCS eye subscore is 4. GCS " verbal subscore is 5. GCS motor subscore is 6.      Motor: Motor function is intact.      Coordination: Coordination is intact.      Gait: Gait is intact.   Psychiatric:         Attention and Perception: Attention normal.         Mood and Affect: Mood normal.         Behavior: Behavior normal.         Thought Content: Thought content normal.         Cognition and Memory: Cognition and memory normal.         Judgment: Judgment normal.         BMI is >= 25 and <30. (Overweight) The following options were offered after discussion;: exercise counseling/recommendations     Assessment & Plan     Current Outpatient Medications:   •  norethindrone-ethinyl estradiol FE (Junel FE 1/20) 1-20 MG-MCG per tablet, Take 1 tablet by mouth Daily., Disp: 28 tablet, Rfl: 2    Problem List Items Addressed This Visit    None     Visit Diagnoses     Routine physical examination    -  Primary    Relevant Orders    Lipid Panel    Abnormal thyroid function test        Relevant Orders    TSH    T3, Free    T4, free    Thyroid Antibodies    Intermittent left-sided chest pain            Recheck thyroid labs   Start breast ca screen at 40  Start colon ca screen at 45  Pt has appt with gyne for pap  Keep appt with cardio tomor       Counseling was given to patient for the following topics: appropriate exercise, healthy eating habits, disease prevention and importance of self breast exam and breast health.      Plan of care reviewed with the patient at the conclusion of today's visit.  Education was provided regarding diagnosis, management, and any prescribed or recommended OTC medications.  Patient verbalized understanding of and agreement with management plan.     Return if symptoms worsen or fail to improve.        TRES Borrero

## 2022-09-27 ENCOUNTER — PATIENT ROUNDING (BHMG ONLY) (OUTPATIENT)
Dept: CARDIOLOGY | Facility: CLINIC | Age: 37
End: 2022-09-27

## 2022-09-27 ENCOUNTER — OFFICE VISIT (OUTPATIENT)
Dept: CARDIOLOGY | Facility: CLINIC | Age: 37
End: 2022-09-27

## 2022-09-27 VITALS
OXYGEN SATURATION: 98 % | BODY MASS INDEX: 26.12 KG/M2 | HEIGHT: 64 IN | DIASTOLIC BLOOD PRESSURE: 66 MMHG | HEART RATE: 93 BPM | SYSTOLIC BLOOD PRESSURE: 114 MMHG | WEIGHT: 153 LBS

## 2022-09-27 DIAGNOSIS — G89.29 CHRONIC CHEST PAIN WITH LOW TO MODERATE RISK FOR CAD: Primary | ICD-10-CM

## 2022-09-27 DIAGNOSIS — Z91.89 CHRONIC CHEST PAIN WITH LOW TO MODERATE RISK FOR CAD: Primary | ICD-10-CM

## 2022-09-27 DIAGNOSIS — R07.9 CHRONIC CHEST PAIN WITH LOW TO MODERATE RISK FOR CAD: Primary | ICD-10-CM

## 2022-09-27 LAB
CHOLEST SERPL-MCNC: 230 MG/DL (ref 0–200)
HDLC SERPL-MCNC: 42 MG/DL (ref 40–60)
LDLC SERPL CALC-MCNC: 165 MG/DL (ref 0–100)
LDLC/HDLC SERPL: 3.88 {RATIO}
T3FREE SERPL-MCNC: 3.44 PG/ML (ref 2–4.4)
T4 FREE SERPL-MCNC: 1.3 NG/DL (ref 0.93–1.7)
THYROGLOB AB SERPL-ACNC: <1 IU/ML (ref 0–0.9)
THYROPEROXIDASE AB SERPL-ACNC: <8 IU/ML (ref 0–34)
TRIGL SERPL-MCNC: 125 MG/DL (ref 0–150)
TSH SERPL DL<=0.05 MIU/L-ACNC: 0.72 UIU/ML (ref 0.27–4.2)
VLDLC SERPL-MCNC: 23 MG/DL (ref 5–40)

## 2022-09-27 PROCEDURE — 99244 OFF/OP CNSLTJ NEW/EST MOD 40: CPT | Performed by: INTERNAL MEDICINE

## 2022-09-27 RX ORDER — ASPIRIN 81 MG/1
81 TABLET ORAL DAILY
Qty: 30 TABLET | Refills: 6 | Status: SHIPPED | OUTPATIENT
Start: 2022-09-27

## 2022-09-27 RX ORDER — NITROGLYCERIN 0.4 MG/1
TABLET SUBLINGUAL
Qty: 30 TABLET | Refills: 1 | Status: SHIPPED | OUTPATIENT
Start: 2022-09-27

## 2022-09-27 NOTE — PROGRESS NOTES
"September 27, 2022    Hello, may I speak with Rahel Ibarra? Yes.    My name is Cheyanne LANDA    I am  with [facility]  [address]  [facility] [address] [phone].    Before we get started may I verify your date of birth? 1985, Yes, correct.    I am calling to officially welcome you to our practice and ask about your recent visit. Is this a good time to talk? Yes.    Tell me about your visit with us. What things went well?  Everything was good.  \"You are all great!\"       We're always looking for ways to make our patients' experiences even better. Do you have recommendations on ways we may improve?  No.    Overall were you satisfied with your first visit to our practice? Yes.       I appreciate you taking the time to speak with me today. Is there anything else I can do for you? No.      Thank you, and have a great day.      "

## 2022-09-27 NOTE — PROGRESS NOTES
"[facility]   Consult  Rahel Ibarra  1985    VISIT DATE:  22    PCP:   Kuldip Beltran, TRES  [address] [place], [place] [id number]        CC:  Palpitations and family history of heart disease      Problem List:  1.  HLD  2.  Family history of CAD Mother  age 55    History of Present Illness:  Rahel Ibarra  Is a 37 y.o. female with pertinent cardiac history detailed above.  Patient referred for evaluation of chest pain.  It is not typically exertional in nature.  It is sharp with radiation to the left arm.  Her EKG in PCPs office in August showed sinus arrhythmia no ST changes. She has had symptoms for 7-8 monhts.  She decreibes it feels heaviness in the left chest.  She also has had heaviness in the left arm 2-3 times.  Her mom  from MI she was 55.  A maternal uncle also passed away in his 50s.  She has siblings that have no known CAD.  It has been ocurring very frequently.  Not related to exertion.  It is random in nature.  Sometimes it is worse with deep breathing.         Patient Active Problem List    Diagnosis Date Noted   • Subserous leiomyoma of uterus 2022   • Annual GYN exam 2022     Note Last Updated: 2022     SCREENING TESTS    Year 2017 2018 2019 2020   Age                       PAP     X  [facility]                  HPV high risk                       MCKAYLA [Birads]                       JUANA (5 year)  Tal Doty (lifetime)                       Colonoscopy                       DEXA [T-score]  Frax [hip/any]                       Lipids  [LDL / HDL / TG]                       Vitamin D                       TSH                         Enter the month test was performed.  If month not known, enter \"X'  · Black numbers = normal results  · Red numbers = abnormal results  · Black X = patient reported normal  · Red X - patient reported abnormal      Referred " "by: Kuldip Beltran, TRES   Profession:    Other info: From [place]            No Known Allergies    Social History     Socioeconomic History   • Marital status:    Tobacco Use   • Smoking status: Never Smoker   • Smokeless tobacco: Never Used   Vaping Use   • Vaping Use: Never used   Substance and Sexual Activity   • Alcohol use: Not Currently   • Drug use: Never   • Sexual activity: Defer       Family History   Problem Relation Age of Onset   • Heart disease Mother    • No Known Problems Father    • No Known Problems Sister    • No Known Problems Brother        Current Medications:    Current Outpatient Medications:   •  norethindrone-ethinyl estradiol FE (Junel FE 1/20) 1-20 MG-MCG per tablet, Take 1 tablet by mouth Daily., Disp: 28 tablet, Rfl: 2  •  aspirin 81 MG EC tablet, Take 1 tablet by mouth Daily., Disp: 30 tablet, Rfl: 6  •  nitroglycerin (NITROSTAT) 0.4 MG SL tablet, 1 under the tongue as needed for angina, may repeat q5mins for up three doses, Disp: 30 tablet, Rfl: 1     Review of Systems   Cardiovascular: Positive for chest pain (Sometimes radiation into the left arm) and dyspnea on exertion. Negative for irregular heartbeat, leg swelling, near-syncope, palpitations and paroxysmal nocturnal dyspnea.       Vitals:    09/27/22 1516   BP: 114/66   BP Location: Left arm   Patient Position: Sitting   Cuff Size: Adult   Pulse: 93   SpO2: 98%   Weight: 69.4 kg (153 lb)   Height: 162.6 cm (64\")       Physical Exam  Constitutional:       Appearance: Normal appearance.   Cardiovascular:      Rate and Rhythm: Normal rate and regular rhythm.      Pulses: Normal pulses.      Heart sounds: Normal heart sounds.   Pulmonary:      Effort: Pulmonary effort is normal.      Breath sounds: Normal breath sounds.   Musculoskeletal:      Right lower leg: No edema.      Left lower leg: No edema.   Neurological:      General: No focal deficit present.      Mental Status: She is alert.         Diagnostic " Data:  Procedures  Lab Results   Component Value Date    TRIG 125 09/26/2022    HDL 42 09/26/2022     Lab Results   Component Value Date    GLUCOSE 86 08/29/2022    BUN 9 08/29/2022    CREATININE 0.62 08/29/2022     08/29/2022    K 4.3 08/29/2022     08/29/2022    CO2 20.1 (L) 08/29/2022     No results found for: HGBA1C  Lab Results   Component Value Date    WBC 6.48 08/29/2022    HGB 13.3 08/29/2022    HCT 41.4 08/29/2022     08/29/2022       Assessment:   Diagnosis Plan   1. Chronic chest pain with low to moderate risk for CAD  CT Angiogram Coronary       Plan:      1.  Chest pain  -Patient does have hyperlipidemia with a total cholesterol 230, , she also does have significant family history of premature CAD and death on maternal side  -I am going to order a CTA to help evaluate further this should also help reveal any significant structural abnormality  -EKG sinus arrhythmia with no ST changes  -Okay to take an aspirin 81 mg daily until evaluated and did give nitro as a trial to see if this helps alleviate her pain          Torsten Murillo MD [facility]

## 2022-09-30 DIAGNOSIS — E78.2 MIXED HYPERLIPIDEMIA: Primary | ICD-10-CM

## 2022-09-30 RX ORDER — ATORVASTATIN CALCIUM 10 MG/1
10 TABLET, FILM COATED ORAL NIGHTLY
Qty: 90 TABLET | Refills: 1 | Status: SHIPPED | OUTPATIENT
Start: 2022-09-30 | End: 2023-01-23 | Stop reason: HOSPADM

## 2022-10-11 ENCOUNTER — OFFICE VISIT (OUTPATIENT)
Dept: OBSTETRICS AND GYNECOLOGY | Facility: CLINIC | Age: 37
End: 2022-10-11

## 2022-10-11 VITALS — BODY MASS INDEX: 26.43 KG/M2 | WEIGHT: 154 LBS | RESPIRATION RATE: 14 BRPM

## 2022-10-11 DIAGNOSIS — N92.6 IRREGULAR MENSES: Primary | ICD-10-CM

## 2022-10-11 DIAGNOSIS — D25.2 SUBSEROUS LEIOMYOMA OF UTERUS: ICD-10-CM

## 2022-10-11 PROCEDURE — 99213 OFFICE O/P EST LOW 20 MIN: CPT | Performed by: OBSTETRICS & GYNECOLOGY

## 2022-10-11 NOTE — PROGRESS NOTES
Subjective   Chief Complaint   Patient presents with   • Menstrual Problem       Rahel Ibarra is a 37 y.o. year old .  Patient's last menstrual period was 2022 (approximate).  She comes today in scheduled follow-up.  She was here having had an ultrasound earlier in the year.  She had a 5 cm anterior subserosal fibroid.  She was having menstrual irregularity and a birth control pill was prescribed.  It took her several months to start it.  She is completed about 4 packs of pills.  She still has menstrual irregularity despite a birth control pill.  She has no other complaints.  She does not want to remove the option of fertility at the current time.    The following portions of the patient's history were reviewed and updated as appropriate:current medications and allergies    Social History    Tobacco Use      Smoking status: Never      Smokeless tobacco: Never         Objective   Resp 14   Wt 69.9 kg (154 lb)   LMP 2022 (Approximate)   BMI 26.43 kg/m²     Lab Review   No data reviewed    Imaging   Pelvic ultrasound images independantly reviewed - Uterus is anteflexed.  There is a fundal fibroid present.  It is distant from the endometrial cavity.  As compared to the prior scan the size is unchanged        Assessment   1. Irregular menses most likely related to progestin predominance from current low-dose pill  2. Subserosal uterine fibroid sonographically unchanged in size and most likely not causative at her irregular cycling     Plan   1. Would like to increase the estrogen in her birth control pill and see if that corrects her bleeding patterns  2. The importance of keeping all planned follow-up and taking all medications as prescribed was emphasized.  3. Follow up in 4 months time to recheck bleeding     New Medications Ordered This Visit   Medications   • norethindrone-ethinyl estradiol (Necon , 28,) 1-35 MG-MCG per tablet     Sig: Take 1 tablet by mouth Daily.     Dispense:  28 tablet      Refill:  6          This note was electronically signed.    Jewel Gutierrez M.D.  October 11, 2022    Total time spent today with Rahel  was 20-29 minutes (level 3) - pathophysiology of her presenting problem along with plans for any diagnositc work-up and treatment.    Part of this note may be an electronic transcription/translation of spoken language to printed text using the Dragon Dictation System.

## 2022-11-23 ENCOUNTER — HOSPITAL ENCOUNTER (OUTPATIENT)
Dept: CT IMAGING | Facility: HOSPITAL | Age: 37
Discharge: HOME | End: 2022-11-23
Admitting: INTERNAL MEDICINE

## 2022-11-23 VITALS
HEART RATE: 86 BPM | SYSTOLIC BLOOD PRESSURE: 107 MMHG | WEIGHT: 149.6 LBS | BODY MASS INDEX: 25.54 KG/M2 | RESPIRATION RATE: 16 BRPM | DIASTOLIC BLOOD PRESSURE: 70 MMHG | HEIGHT: 64 IN

## 2022-11-23 DIAGNOSIS — Z91.89 CHRONIC CHEST PAIN WITH LOW TO MODERATE RISK FOR CAD: ICD-10-CM

## 2022-11-23 DIAGNOSIS — G89.29 CHRONIC CHEST PAIN WITH LOW TO MODERATE RISK FOR CAD: ICD-10-CM

## 2022-11-23 DIAGNOSIS — R07.9 CHRONIC CHEST PAIN WITH LOW TO MODERATE RISK FOR CAD: ICD-10-CM

## 2022-11-23 LAB — B-HCG UR QL: NEGATIVE

## 2022-11-23 PROCEDURE — 0 IOPAMIDOL PER 1 ML: Performed by: INTERNAL MEDICINE

## 2022-11-23 PROCEDURE — 75574 CT ANGIO HRT W/3D IMAGE: CPT

## 2022-11-23 PROCEDURE — 81025 URINE PREGNANCY TEST: CPT | Performed by: STUDENT IN AN ORGANIZED HEALTH CARE EDUCATION/TRAINING PROGRAM

## 2022-11-23 RX ORDER — SODIUM CHLORIDE 0.9 % (FLUSH) 0.9 %
10 SYRINGE (ML) INJECTION AS NEEDED
Status: DISCONTINUED | OUTPATIENT
Start: 2022-11-23 | End: 2022-11-24 | Stop reason: HOSPADM

## 2022-11-23 RX ORDER — METOPROLOL TARTRATE 50 MG/1
100 TABLET, FILM COATED ORAL ONCE AS NEEDED
Status: COMPLETED | OUTPATIENT
Start: 2022-11-23 | End: 2022-11-23

## 2022-11-23 RX ORDER — METOPROLOL TARTRATE 50 MG/1
50 TABLET, FILM COATED ORAL ONCE AS NEEDED
Status: COMPLETED | OUTPATIENT
Start: 2022-11-23 | End: 2022-11-23

## 2022-11-23 RX ORDER — NITROGLYCERIN 0.4 MG/1
0.4 TABLET SUBLINGUAL
Status: COMPLETED | OUTPATIENT
Start: 2022-11-23 | End: 2022-11-23

## 2022-11-23 RX ADMIN — IOPAMIDOL 65 ML: 755 INJECTION, SOLUTION INTRAVENOUS at 16:52

## 2022-11-23 RX ADMIN — NITROGLYCERIN 0.4 MG: 0.4 TABLET SUBLINGUAL at 16:40

## 2022-11-23 RX ADMIN — METOPROLOL TARTRATE 50 MG: 50 TABLET, FILM COATED ORAL at 14:57

## 2022-11-25 ENCOUNTER — APPOINTMENT (OUTPATIENT)
Dept: CT IMAGING | Facility: HOSPITAL | Age: 37
End: 2022-11-25

## 2022-11-30 ENCOUNTER — TELEPHONE (OUTPATIENT)
Dept: CARDIOLOGY | Facility: CLINIC | Age: 37
End: 2022-11-30

## 2022-11-30 DIAGNOSIS — R07.89 CHEST PAIN, ATYPICAL: Primary | ICD-10-CM

## 2023-01-03 ENCOUNTER — HOSPITAL ENCOUNTER (OUTPATIENT)
Dept: CARDIOLOGY | Facility: HOSPITAL | Age: 38
Discharge: HOME | End: 2023-01-03
Admitting: INTERNAL MEDICINE
Payer: MEDICAID

## 2023-01-03 VITALS — BODY MASS INDEX: 25.44 KG/M2 | WEIGHT: 149 LBS | HEIGHT: 64 IN

## 2023-01-03 DIAGNOSIS — R07.89 CHEST PAIN, ATYPICAL: ICD-10-CM

## 2023-01-03 LAB
BH CV ECHO MEAS - AO MAX PG: 6.1 MMHG
BH CV ECHO MEAS - AO MEAN PG: 4.2 MMHG
BH CV ECHO MEAS - AO ROOT DIAM: 2.7 CM
BH CV ECHO MEAS - AO V2 MAX: 123.1 CM/SEC
BH CV ECHO MEAS - AO V2 VTI: 24.4 CM
BH CV ECHO MEAS - AVA(I,D): 2.09 CM2
BH CV ECHO MEAS - EDV(CUBED): 73.4 ML
BH CV ECHO MEAS - EDV(MOD-SP2): 43.4 ML
BH CV ECHO MEAS - EDV(MOD-SP4): 66.4 ML
BH CV ECHO MEAS - EF(MOD-BP): 55 %
BH CV ECHO MEAS - EF(MOD-SP2): 55.1 %
BH CV ECHO MEAS - EF(MOD-SP4): 55.1 %
BH CV ECHO MEAS - ESV(CUBED): 8.9 ML
BH CV ECHO MEAS - ESV(MOD-SP2): 19.5 ML
BH CV ECHO MEAS - ESV(MOD-SP4): 29.8 ML
BH CV ECHO MEAS - FS: 50.4 %
BH CV ECHO MEAS - IVS/LVPW: 1.17 CM
BH CV ECHO MEAS - IVSD: 0.82 CM
BH CV ECHO MEAS - LA DIMENSION: 2.04 CM
BH CV ECHO MEAS - LAT PEAK E' VEL: 13.2 CM/SEC
BH CV ECHO MEAS - LV DIASTOLIC VOL/BSA (35-75): 38.5 CM2
BH CV ECHO MEAS - LV MASS(C)D: 94.6 GRAMS
BH CV ECHO MEAS - LV MAX PG: 3.7 MMHG
BH CV ECHO MEAS - LV MEAN PG: 2.06 MMHG
BH CV ECHO MEAS - LV SYSTOLIC VOL/BSA (12-30): 17.3 CM2
BH CV ECHO MEAS - LV V1 MAX: 95.5 CM/SEC
BH CV ECHO MEAS - LV V1 VTI: 16.7 CM
BH CV ECHO MEAS - LVIDD: 4.2 CM
BH CV ECHO MEAS - LVIDS: 2.08 CM
BH CV ECHO MEAS - LVOT AREA: 3.1 CM2
BH CV ECHO MEAS - LVOT DIAM: 1.97 CM
BH CV ECHO MEAS - LVPWD: 0.7 CM
BH CV ECHO MEAS - MED PEAK E' VEL: 8.9 CM/SEC
BH CV ECHO MEAS - MV A MAX VEL: 61.3 CM/SEC
BH CV ECHO MEAS - MV DEC SLOPE: 288.7 CM/SEC2
BH CV ECHO MEAS - MV DEC TIME: 0.2 MSEC
BH CV ECHO MEAS - MV E MAX VEL: 63.8 CM/SEC
BH CV ECHO MEAS - MV E/A: 1.04
BH CV ECHO MEAS - MV MAX PG: 3.3 MMHG
BH CV ECHO MEAS - MV MEAN PG: 1.73 MMHG
BH CV ECHO MEAS - MV P1/2T: 94.4 MSEC
BH CV ECHO MEAS - MV V2 VTI: 21.9 CM
BH CV ECHO MEAS - MVA(P1/2T): 2.33 CM2
BH CV ECHO MEAS - MVA(VTI): 2.33 CM2
BH CV ECHO MEAS - PA ACC TIME: 0.12 SEC
BH CV ECHO MEAS - PA PR(ACCEL): 25.1 MMHG
BH CV ECHO MEAS - PI END-D VEL: 116.7 CM/SEC
BH CV ECHO MEAS - RAP SYSTOLE: 3 MMHG
BH CV ECHO MEAS - RVSP: 18 MMHG
BH CV ECHO MEAS - SI(MOD-SP2): 13.8 ML/M2
BH CV ECHO MEAS - SI(MOD-SP4): 21.2 ML/M2
BH CV ECHO MEAS - SV(LVOT): 51.1 ML
BH CV ECHO MEAS - SV(MOD-SP2): 23.9 ML
BH CV ECHO MEAS - SV(MOD-SP4): 36.6 ML
BH CV ECHO MEAS - TAPSE (>1.6): 2.36 CM
BH CV ECHO MEAS - TR MAX PG: 14.7 MMHG
BH CV ECHO MEAS - TR MAX VEL: 191.3 CM/SEC
BH CV ECHO MEASUREMENTS AVERAGE E/E' RATIO: 5.77
BH CV VAS BP RIGHT ARM: NORMAL MMHG
BH CV XLRA - RV BASE: 3.4 CM
BH CV XLRA - RV LENGTH: 6.6 CM
BH CV XLRA - RV MID: 2.9 CM
BH CV XLRA - TDI S': 13.7 CM/SEC
LEFT ATRIUM VOLUME INDEX: 8.7 ML/M2
MAXIMAL PREDICTED HEART RATE: 185 BPM
STRESS TARGET HR: 157 BPM

## 2023-01-03 PROCEDURE — 93306 TTE W/DOPPLER COMPLETE: CPT | Performed by: INTERNAL MEDICINE

## 2023-01-03 PROCEDURE — 93306 TTE W/DOPPLER COMPLETE: CPT

## 2023-01-23 ENCOUNTER — OFFICE VISIT (OUTPATIENT)
Dept: INTERNAL MEDICINE | Facility: CLINIC | Age: 38
End: 2023-01-23
Payer: MEDICAID

## 2023-01-23 VITALS
SYSTOLIC BLOOD PRESSURE: 110 MMHG | WEIGHT: 145 LBS | TEMPERATURE: 97.1 F | HEIGHT: 64 IN | HEART RATE: 109 BPM | BODY MASS INDEX: 24.75 KG/M2 | DIASTOLIC BLOOD PRESSURE: 70 MMHG | OXYGEN SATURATION: 99 %

## 2023-01-23 DIAGNOSIS — J30.89 NON-SEASONAL ALLERGIC RHINITIS DUE TO OTHER ALLERGIC TRIGGER: Primary | ICD-10-CM

## 2023-01-23 DIAGNOSIS — R12 HEARTBURN: ICD-10-CM

## 2023-01-23 DIAGNOSIS — R07.89 OTHER CHEST PAIN: ICD-10-CM

## 2023-01-23 DIAGNOSIS — R11.0 NAUSEA: ICD-10-CM

## 2023-01-23 DIAGNOSIS — N64.4 BREAST PAIN, LEFT: ICD-10-CM

## 2023-01-23 PROCEDURE — 99214 OFFICE O/P EST MOD 30 MIN: CPT | Performed by: NURSE PRACTITIONER

## 2023-01-23 RX ORDER — FAMOTIDINE 20 MG/1
20 TABLET, FILM COATED ORAL 2 TIMES DAILY
Qty: 60 TABLET | Refills: 1 | Status: SHIPPED | OUTPATIENT
Start: 2023-01-23 | End: 2023-02-13 | Stop reason: SDUPTHER

## 2023-01-23 RX ORDER — MONTELUKAST SODIUM 10 MG/1
10 TABLET ORAL NIGHTLY
Qty: 30 TABLET | Refills: 1 | Status: SHIPPED | OUTPATIENT
Start: 2023-01-23 | End: 2023-02-13 | Stop reason: SDUPTHER

## 2023-01-23 RX ORDER — FLUTICASONE PROPIONATE 50 MCG
2 SPRAY, SUSPENSION (ML) NASAL DAILY
Qty: 16 G | Refills: 3 | Status: SHIPPED | OUTPATIENT
Start: 2023-01-23 | End: 2023-02-13 | Stop reason: SDUPTHER

## 2023-01-23 RX ORDER — ONDANSETRON 8 MG/1
TABLET, ORALLY DISINTEGRATING ORAL
Qty: 30 TABLET | Refills: 1 | Status: SHIPPED | OUTPATIENT
Start: 2023-01-23

## 2023-01-23 RX ORDER — LEVOCETIRIZINE DIHYDROCHLORIDE 5 MG/1
5 TABLET, FILM COATED ORAL EVERY EVENING
Qty: 30 TABLET | Refills: 1 | Status: SHIPPED | OUTPATIENT
Start: 2023-01-23 | End: 2023-02-13 | Stop reason: SDUPTHER

## 2023-01-23 NOTE — PROGRESS NOTES
"Chief Complaint   Patient presents with   • Follow-up   • Allergies   • Chest Pain       HPI  Rahel Ibarra is a 37 y.o. female presents for allergies.  She complains of frequent sneezing, runny nose, and difficulty breathing out of nose.  She has not been taking any allergy medication.    Pt continues to have left side chest pain.  She has been to cardiology (Dr Murillo) and had a normal Echo and cardio angiogram.    Pt complains of nausea.  This has been occurring frequently along with some acid reflux.  She has not been taking anything for it.    The following portions of the patient's history were reviewed and updated as appropriate: allergies, current medications, past family history, past medical history, past social history, past surgical history and problem list.    Subjective  Review of Systems   Constitutional: Negative for activity change, appetite change and fatigue.   HENT: Positive for postnasal drip, rhinorrhea and sneezing. Negative for congestion and sinus pressure.    Respiratory: Negative for cough and shortness of breath.    Cardiovascular: Positive for chest pain. Negative for leg swelling.   Gastrointestinal: Positive for nausea and indigestion. Negative for abdominal pain.   Genitourinary: Positive for breast pain (left).   Neurological: Negative for dizziness, weakness, headache and confusion.   Psychiatric/Behavioral: Negative for behavioral problems and decreased concentration.        Objective  Visit Vitals  /70 (BP Location: Left arm, Patient Position: Sitting)   Pulse 109   Temp 97.1 °F (36.2 °C)   Ht 162.6 cm (64\")   Wt 65.8 kg (145 lb)   SpO2 99%   BMI 24.89 kg/m²        Physical Exam  Vitals and nursing note reviewed.   Constitutional:       Appearance: Normal appearance.   HENT:      Head: Normocephalic.      Nose: Mucosal edema present.      Right Turbinates: Enlarged.      Left Turbinates: Enlarged.      Right Sinus: No maxillary sinus tenderness or frontal sinus tenderness. "      Left Sinus: No maxillary sinus tenderness or frontal sinus tenderness.   Eyes:      Pupils: Pupils are equal, round, and reactive to light.   Cardiovascular:      Rate and Rhythm: Normal rate and regular rhythm.      Pulses: Normal pulses.      Heart sounds: Normal heart sounds.   Pulmonary:      Effort: Pulmonary effort is normal. No respiratory distress.      Breath sounds: Normal breath sounds.   Skin:     General: Skin is warm and dry.      Capillary Refill: Capillary refill takes less than 2 seconds.   Neurological:      General: No focal deficit present.      Mental Status: She is alert and oriented to person, place, and time.      Gait: Gait is intact.   Psychiatric:         Attention and Perception: Attention normal.         Mood and Affect: Mood normal.         Behavior: Behavior normal.           Procedures     Assessment and Plan  Diagnoses and all orders for this visit:    1. Non-seasonal allergic rhinitis due to other allergic trigger (Primary)  -     fluticasone (FLONASE) 50 MCG/ACT nasal spray; 2 sprays into the nostril(s) as directed by provider Daily.  Dispense: 16 g; Refill: 3  -     montelukast (Singulair) 10 MG tablet; Take 1 tablet by mouth Every Night.  Dispense: 30 tablet; Refill: 1  -     levocetirizine (XYZAL) 5 MG tablet; Take 1 tablet by mouth Every Evening.  Dispense: 30 tablet; Refill: 1    2. Nausea  -     ondansetron ODT (ZOFRAN-ODT) 8 MG disintegrating tablet; Take 1/2 to 1 tablet by mouth (dissolve under tongue or swallow) every 8 hours as needed for nausea.  Dispense: 30 tablet; Refill: 1  -     famotidine (Pepcid) 20 MG tablet; Take 1 tablet by mouth 2 (Two) Times a Day.  Dispense: 60 tablet; Refill: 1    3. Heartburn  -     famotidine (Pepcid) 20 MG tablet; Take 1 tablet by mouth 2 (Two) Times a Day.  Dispense: 60 tablet; Refill: 1    4. Breast pain, left  -     Mammo Diagnostic Bilateral With CAD; Future    5. Other chest pain      Order mammo  Start Flonase/Singulair/Xyzal  for allergies  Zofran prn nausea  Pepcid to try for heart burn, could be the cause of CP  Cardiac diagnostics & office note reviewed     Return in about 3 weeks (around 2/13/2023) for Recheck Allergies, heartburn.        Kuldip Farah, APRN

## 2023-02-02 ENCOUNTER — TELEPHONE (OUTPATIENT)
Dept: INTERNAL MEDICINE | Facility: CLINIC | Age: 38
End: 2023-02-02
Payer: MEDICAID

## 2023-02-02 DIAGNOSIS — N64.4 BREAST PAIN, LEFT: Primary | ICD-10-CM

## 2023-02-02 NOTE — TELEPHONE ENCOUNTER
"Central scheduling stated \" 01/24/2023, This is an incorrect order: Correct order: [id number]: MAMMO DIAGNOSTIC DIGITAL TOMOSYNTHESIS BILATERAL W CAD, ALSO NEED AN US OF LEFT BREAST LIMITED. Please make correction and send back for scheduling.\" please advise.   "

## 2023-02-13 ENCOUNTER — OFFICE VISIT (OUTPATIENT)
Dept: INTERNAL MEDICINE | Facility: CLINIC | Age: 38
End: 2023-02-13
Payer: MEDICAID

## 2023-02-13 VITALS
WEIGHT: 145.4 LBS | TEMPERATURE: 97.1 F | HEART RATE: 89 BPM | DIASTOLIC BLOOD PRESSURE: 78 MMHG | OXYGEN SATURATION: 99 % | HEIGHT: 64 IN | BODY MASS INDEX: 24.82 KG/M2 | SYSTOLIC BLOOD PRESSURE: 118 MMHG

## 2023-02-13 DIAGNOSIS — R12 HEARTBURN: Primary | ICD-10-CM

## 2023-02-13 DIAGNOSIS — R11.0 NAUSEA: ICD-10-CM

## 2023-02-13 DIAGNOSIS — J30.89 NON-SEASONAL ALLERGIC RHINITIS DUE TO OTHER ALLERGIC TRIGGER: ICD-10-CM

## 2023-02-13 PROCEDURE — 99214 OFFICE O/P EST MOD 30 MIN: CPT | Performed by: NURSE PRACTITIONER

## 2023-02-13 RX ORDER — FAMOTIDINE 20 MG/1
20 TABLET, FILM COATED ORAL 2 TIMES DAILY
Qty: 60 TABLET | Refills: 11 | Status: SHIPPED | OUTPATIENT
Start: 2023-02-13

## 2023-02-13 RX ORDER — MONTELUKAST SODIUM 10 MG/1
10 TABLET ORAL NIGHTLY
Qty: 30 TABLET | Refills: 11 | Status: SHIPPED | OUTPATIENT
Start: 2023-02-13

## 2023-02-13 RX ORDER — FLUTICASONE PROPIONATE 50 MCG
2 SPRAY, SUSPENSION (ML) NASAL DAILY
Qty: 16 G | Refills: 11 | Status: SHIPPED | OUTPATIENT
Start: 2023-02-13

## 2023-02-13 RX ORDER — LEVOCETIRIZINE DIHYDROCHLORIDE 5 MG/1
5 TABLET, FILM COATED ORAL EVERY EVENING
Qty: 30 TABLET | Refills: 11 | Status: SHIPPED | OUTPATIENT
Start: 2023-02-13

## 2023-02-13 NOTE — PROGRESS NOTES
"Chief Complaint   Patient presents with   • Allergies   • Heartburn   • Follow-up       HPI  Rahel Ibarra is a 37 y.o. female presents for follow-up on allergies and heartburn.  Pt was started on Pepcid at her last appt.  She states she takes it \"every now and again\".  She states that her heartburn and chest pain have both resolved.  She has not had anymore symptoms.  Her allergies are also improved on Xyzal, Singulair, and Flonase.  She feels like she is breathing a lot better through her nose.    The following portions of the patient's history were reviewed and updated as appropriate: allergies, current medications, past family history, past medical history, past social history, past surgical history and problem list.    Subjective  Review of Systems   Constitutional: Negative for activity change, appetite change and fatigue.   HENT: Negative for congestion, rhinorrhea and sinus pressure.    Respiratory: Negative for cough and shortness of breath.    Cardiovascular: Negative for chest pain and leg swelling.   Gastrointestinal: Negative for abdominal pain and GERD.   Neurological: Negative for dizziness, weakness and confusion.   Psychiatric/Behavioral: Negative for behavioral problems and decreased concentration.       Objective  Visit Vitals  /78 (BP Location: Left arm, Patient Position: Sitting)   Pulse 89   Temp 97.1 °F (36.2 °C)   Ht 162.6 cm (64\")   Wt 66 kg (145 lb 6.4 oz)   SpO2 99%   BMI 24.96 kg/m²        Physical Exam  Vitals and nursing note reviewed.   HENT:      Head: Normocephalic.      Nose: Mucosal edema present.   Eyes:      Pupils: Pupils are equal, round, and reactive to light.   Cardiovascular:      Rate and Rhythm: Normal rate and regular rhythm.      Pulses: Normal pulses.      Heart sounds: Normal heart sounds.   Pulmonary:      Effort: Pulmonary effort is normal.      Breath sounds: Normal breath sounds.   Skin:     General: Skin is warm and dry.      Capillary Refill: Capillary " refill takes less than 2 seconds.   Neurological:      General: No focal deficit present.      Mental Status: She is alert and oriented to person, place, and time.      Gait: Gait is intact.   Psychiatric:         Attention and Perception: Attention normal.         Mood and Affect: Mood normal.         Behavior: Behavior normal.          Procedures     Assessment and Plan  Diagnoses and all orders for this visit:    1. Heartburn (Primary)  -     famotidine (Pepcid) 20 MG tablet; Take 1 tablet by mouth 2 (Two) Times a Day.  Dispense: 60 tablet; Refill: 11    2. Non-seasonal allergic rhinitis due to other allergic trigger  -     fluticasone (FLONASE) 50 MCG/ACT nasal spray; 2 sprays into the nostril(s) as directed by provider Daily.  Dispense: 16 g; Refill: 11  -     levocetirizine (XYZAL) 5 MG tablet; Take 1 tablet by mouth Every Evening.  Dispense: 30 tablet; Refill: 11  -     montelukast (Singulair) 10 MG tablet; Take 1 tablet by mouth Every Night.  Dispense: 30 tablet; Refill: 11    3. Nausea    Heartburn and CP has resolved with Pepcid, will cont  Nausea has also resolved  Allergy symptoms much improved, cont Flonase/Xyzal/Singulair - no need to see allergist at this time    Return if symptoms worsen or fail to improve.        TRES Brownlee

## 2023-04-25 DIAGNOSIS — J30.89 NON-SEASONAL ALLERGIC RHINITIS DUE TO OTHER ALLERGIC TRIGGER: ICD-10-CM

## 2023-04-25 RX ORDER — LEVOCETIRIZINE DIHYDROCHLORIDE 5 MG/1
5 TABLET, FILM COATED ORAL EVERY EVENING
Qty: 30 TABLET | Refills: 11 | Status: SHIPPED | OUTPATIENT
Start: 2023-04-25

## 2023-04-25 RX ORDER — MONTELUKAST SODIUM 10 MG/1
10 TABLET ORAL NIGHTLY
Qty: 30 TABLET | Refills: 11 | Status: SHIPPED | OUTPATIENT
Start: 2023-04-25

## 2023-04-25 NOTE — TELEPHONE ENCOUNTER
Caller: Rahel Ibarra    Relationship: Self    Best call back number: [phone]    Requested Prescriptions:   Requested Prescriptions     Pending Prescriptions Disp Refills   • norethindrone-ethinyl estradiol (Necon 1/35, 28,) 1-35 MG-MCG per tablet 28 tablet 6     Sig: Take 1 tablet by mouth Daily.   • montelukast (Singulair) 10 MG tablet 30 tablet 11     Sig: Take 1 tablet by mouth Every Night.   • levocetirizine (XYZAL) 5 MG tablet 30 tablet 11     Sig: Take 1 tablet by mouth Every Evening.        Pharmacy where request should be sent: [id number] [address] AT [facility] [address] - [phone] [facility] [phone] FX     Last office visit with prescribing clinician: 2/13/2023   Last telemedicine visit with prescribing clinician: Visit date not found   Next office visit with prescribing clinician: Visit date not found     Additional details provided by patient: PATIENT IS LEAVING THE COUNTRY THIS Thursday AND REQUESTING A 4 MONTH SUPPLY OF MEDICATIONS. PATIENT NEEDED HERE ALLERGY AND CHOLESTEROL MEDICATIONS REFILLED.    Does the patient have less than a 3 day supply:  [] Yes  [x] No    Would you like a call back once the refill request has been completed: [x] Yes [] No    If the office needs to give you a call back, can they leave a voicemail: [x] Yes [] No    Alyssa Lay Rep   04/25/23 12:55 EDT

## 2023-10-04 RX ORDER — NORETHINDRONE ACETATE AND ETHINYL ESTRADIOL 1MG-20(21)
KIT ORAL
Qty: 28 TABLET | Refills: 2 | OUTPATIENT
Start: 2023-10-04

## 2025-04-18 ENCOUNTER — OFFICE VISIT (OUTPATIENT)
Dept: INTERNAL MEDICINE | Facility: CLINIC | Age: 40
End: 2025-04-18
Payer: MEDICAID

## 2025-04-18 ENCOUNTER — LAB (OUTPATIENT)
Dept: INTERNAL MEDICINE | Facility: CLINIC | Age: 40
End: 2025-04-18
Payer: MEDICAID

## 2025-04-18 VITALS
HEART RATE: 80 BPM | SYSTOLIC BLOOD PRESSURE: 118 MMHG | TEMPERATURE: 97.5 F | OXYGEN SATURATION: 99 % | DIASTOLIC BLOOD PRESSURE: 70 MMHG | HEIGHT: 65 IN | BODY MASS INDEX: 24.16 KG/M2 | WEIGHT: 145 LBS

## 2025-04-18 DIAGNOSIS — R11.0 NAUSEA: ICD-10-CM

## 2025-04-18 DIAGNOSIS — J30.89 NON-SEASONAL ALLERGIC RHINITIS DUE TO OTHER ALLERGIC TRIGGER: ICD-10-CM

## 2025-04-18 DIAGNOSIS — J30.9 CHRONIC ALLERGIC RHINITIS: ICD-10-CM

## 2025-04-18 DIAGNOSIS — N64.4 BREAST PAIN, LEFT: ICD-10-CM

## 2025-04-18 DIAGNOSIS — M62.89 MUSCLE TIGHTNESS: ICD-10-CM

## 2025-04-18 DIAGNOSIS — R12 HEARTBURN: ICD-10-CM

## 2025-04-18 DIAGNOSIS — Z00.00 HEALTHCARE MAINTENANCE: Primary | ICD-10-CM

## 2025-04-18 DIAGNOSIS — Z11.59 ENCOUNTER FOR HEPATITIS C SCREENING TEST FOR LOW RISK PATIENT: ICD-10-CM

## 2025-04-18 DIAGNOSIS — Z00.00 ROUTINE PHYSICAL EXAMINATION: Primary | ICD-10-CM

## 2025-04-18 DIAGNOSIS — Z30.41 ENCOUNTER FOR SURVEILLANCE OF CONTRACEPTIVE PILLS: ICD-10-CM

## 2025-04-18 DIAGNOSIS — Z12.4 SCREENING FOR CERVICAL CANCER: ICD-10-CM

## 2025-04-18 PROCEDURE — 99395 PREV VISIT EST AGE 18-39: CPT | Performed by: NURSE PRACTITIONER

## 2025-04-18 PROCEDURE — 80061 LIPID PANEL: CPT | Performed by: NURSE PRACTITIONER

## 2025-04-18 PROCEDURE — 1160F RVW MEDS BY RX/DR IN RCRD: CPT | Performed by: NURSE PRACTITIONER

## 2025-04-18 PROCEDURE — 2014F MENTAL STATUS ASSESS: CPT | Performed by: NURSE PRACTITIONER

## 2025-04-18 PROCEDURE — 83036 HEMOGLOBIN GLYCOSYLATED A1C: CPT | Performed by: NURSE PRACTITIONER

## 2025-04-18 PROCEDURE — 80053 COMPREHEN METABOLIC PANEL: CPT | Performed by: NURSE PRACTITIONER

## 2025-04-18 PROCEDURE — 36415 COLL VENOUS BLD VENIPUNCTURE: CPT | Performed by: NURSE PRACTITIONER

## 2025-04-18 PROCEDURE — 84443 ASSAY THYROID STIM HORMONE: CPT | Performed by: NURSE PRACTITIONER

## 2025-04-18 PROCEDURE — 1159F MED LIST DOCD IN RCRD: CPT | Performed by: NURSE PRACTITIONER

## 2025-04-18 PROCEDURE — 86803 HEPATITIS C AB TEST: CPT | Performed by: NURSE PRACTITIONER

## 2025-04-18 PROCEDURE — 85025 COMPLETE CBC W/AUTO DIFF WBC: CPT | Performed by: NURSE PRACTITIONER

## 2025-04-18 RX ORDER — LEVOCETIRIZINE DIHYDROCHLORIDE 5 MG/1
5 TABLET, FILM COATED ORAL EVERY EVENING
Qty: 30 TABLET | Refills: 11 | Status: SHIPPED | OUTPATIENT
Start: 2025-04-18

## 2025-04-18 RX ORDER — MONTELUKAST SODIUM 10 MG/1
10 TABLET ORAL NIGHTLY
Qty: 30 TABLET | Refills: 11 | Status: SHIPPED | OUTPATIENT
Start: 2025-04-18

## 2025-04-18 RX ORDER — FLUTICASONE PROPIONATE 50 MCG
2 SPRAY, SUSPENSION (ML) NASAL DAILY
Qty: 16 G | Refills: 11 | Status: SHIPPED | OUTPATIENT
Start: 2025-04-18

## 2025-04-18 RX ORDER — PANTOPRAZOLE SODIUM 40 MG/1
40 TABLET, DELAYED RELEASE ORAL DAILY
Qty: 30 TABLET | Refills: 11 | Status: SHIPPED | OUTPATIENT
Start: 2025-04-18

## 2025-04-18 RX ORDER — METHOCARBAMOL 500 MG/1
500 TABLET, FILM COATED ORAL
Qty: 30 TABLET | Refills: 2 | Status: SHIPPED | OUTPATIENT
Start: 2025-04-18

## 2025-04-18 RX ORDER — ONDANSETRON 8 MG/1
TABLET, ORALLY DISINTEGRATING ORAL
Qty: 30 TABLET | Refills: 1 | Status: SHIPPED | OUTPATIENT
Start: 2025-04-18

## 2025-04-18 RX ORDER — FAMOTIDINE 40 MG/1
40 TABLET, FILM COATED ORAL 2 TIMES DAILY
Qty: 60 TABLET | Refills: 11 | Status: SHIPPED | OUTPATIENT
Start: 2025-04-18

## 2025-04-18 NOTE — PROGRESS NOTES
"Chief Complaint   Patient presents with    Annual Exam    Breast Pain    Allergies       HPI  Rahel Ibarra is a 39 y.o. female presents for a routine physical.  Pt complains of left breast pain for about a year.  She had a mammogram over a [place] ago in her country.     The following portions of the patient's history were reviewed and updated as appropriate: allergies, current medications, past family history, past medical history, past social history, past surgical history, and problem list.    Subjective  Review of Systems   HENT:  Positive for congestion and rhinorrhea.    Gastrointestinal:  Positive for indigestion.   Genitourinary:  Positive for breast pain (left). Negative for breast discharge and breast lump.       Objective  Visit Vitals  /70 (BP Location: Left arm, Patient Position: Sitting)   Pulse 80   Temp 97.5 °F (36.4 °C)   Ht 165.1 cm (65\")   Wt 65.8 kg (145 lb)   SpO2 99%   BMI 24.13 kg/m²        Physical Exam  Vitals and nursing note reviewed.   HENT:      Head: Normocephalic.      Ears:      Comments: Both TM dull     Mouth/Throat:      Lips: Pink.      Mouth: Mucous membranes are moist.      Pharynx: Posterior oropharyngeal erythema present. No oropharyngeal exudate.   Eyes:      Pupils: Pupils are equal, round, and reactive to light.   Neck:      Thyroid: No thyromegaly.      Vascular: No carotid bruit.   Cardiovascular:      Rate and Rhythm: Normal rate and regular rhythm.      Pulses: Normal pulses.      Heart sounds: Normal heart sounds. No murmur heard.  Pulmonary:      Effort: Pulmonary effort is normal. No respiratory distress.      Breath sounds: Normal breath sounds.   Lymphadenopathy:      Cervical: No cervical adenopathy.   Skin:     General: Skin is warm and dry.      Capillary Refill: Capillary refill takes less than 2 seconds.   Neurological:      General: No focal deficit present.      Mental Status: She is alert and oriented to person, place, and time.      Gait: Gait is " intact.   Psychiatric:         Attention and Perception: Attention normal.         Mood and Affect: Mood normal.         Behavior: Behavior normal.          Procedures     Assessment and Plan  Diagnoses and all orders for this visit:    1. Routine physical examination (Primary)  -     CBC & Differential  -     Comprehensive Metabolic Panel  -     Lipid Panel  -     TSH Rfx On Abnormal To Free T4  -     Hemoglobin A1c    2. Non-seasonal allergic rhinitis due to other allergic trigger  -     fluticasone (FLONASE) 50 MCG/ACT nasal spray; Administer 2 sprays into the nostril(s) as directed by provider Daily.  Dispense: 16 g; Refill: 11  -     levocetirizine (XYZAL) 5 MG tablet; Take 1 tablet by mouth Every Evening.  Dispense: 30 tablet; Refill: 11  -     montelukast (Singulair) 10 MG tablet; Take 1 tablet by mouth Every Night.  Dispense: 30 tablet; Refill: 11    3. Screening for cervical cancer  -     Ambulatory Referral to Obstetrics / Gynecology    4. Chronic allergic rhinitis  -     Ambulatory Referral to Allergy    5. Heartburn  -     famotidine (Pepcid) 40 MG tablet; Take 1 tablet by mouth 2 (Two) Times a Day.  Dispense: 60 tablet; Refill: 11  -     pantoprazole (Protonix) 40 MG EC tablet; Take 1 tablet by mouth Daily.  Dispense: 30 tablet; Refill: 11    6. Encounter for surveillance of contraceptive pills  -     norethindrone-ethinyl estradiol (Necon 1/35, 28,) 1-35 MG-MCG per tablet; Take 1 tablet by mouth Daily.  Dispense: 28 tablet; Refill: 6    7. Breast pain, left  -     Mammo Diagnostic Digital Tomosynthesis Bilateral With CAD; Future    8. Encounter for hepatitis C screening test for low risk patient  -     Hepatitis C Antibody    9. Muscle tightness  -     methocarbamol (ROBAXIN) 500 MG tablet; Take 1 tablet by mouth every night at bedtime.  Dispense: 30 tablet; Refill: 2    10. Nausea  -     ondansetron ODT (ZOFRAN-ODT) 8 MG disintegrating tablet; Take 1/2 to 1 tablet by mouth (dissolve under tongue or  swallow) every 8 hours as needed for nausea.  Dispense: 30 tablet; Refill: 1    Check labs  Refer for pap  Order diagnostic mammo for left breast pain  Refer to allergist per pt request  Cont Pepcid and add Protonix for heartburn per pt request  OCP refilled  Start Robaxin for muscle tightness  Zofran prn nausea    Return in about 1 year (around 4/18/2026) for Annual physical.        Kuldip Farah APRN

## 2025-04-19 LAB
ALBUMIN SERPL-MCNC: 3.9 G/DL (ref 3.5–5.2)
ALBUMIN/GLOB SERPL: 1.2 G/DL
ALP SERPL-CCNC: 93 U/L (ref 39–117)
ALT SERPL W P-5'-P-CCNC: 5 U/L (ref 1–33)
ANION GAP SERPL CALCULATED.3IONS-SCNC: 14 MMOL/L (ref 5–15)
AST SERPL-CCNC: 17 U/L (ref 1–32)
BASOPHILS # BLD AUTO: 0.05 10*3/MM3 (ref 0–0.2)
BASOPHILS NFR BLD AUTO: 0.7 % (ref 0–1.5)
BILIRUB SERPL-MCNC: <0.2 MG/DL (ref 0–1.2)
BUN SERPL-MCNC: 8 MG/DL (ref 6–20)
BUN/CREAT SERPL: 15.7 (ref 7–25)
CALCIUM SPEC-SCNC: 9.9 MG/DL (ref 8.6–10.5)
CHLORIDE SERPL-SCNC: 104 MMOL/L (ref 98–107)
CHOLEST SERPL-MCNC: 217 MG/DL (ref 0–200)
CO2 SERPL-SCNC: 22 MMOL/L (ref 22–29)
CREAT SERPL-MCNC: 0.51 MG/DL (ref 0.57–1)
DEPRECATED RDW RBC AUTO: 36.7 FL (ref 37–54)
EGFRCR SERPLBLD CKD-EPI 2021: 123.5 ML/MIN/1.73
EOSINOPHIL # BLD AUTO: 0.29 10*3/MM3 (ref 0–0.4)
EOSINOPHIL NFR BLD AUTO: 4.3 % (ref 0.3–6.2)
ERYTHROCYTE [DISTWIDTH] IN BLOOD BY AUTOMATED COUNT: 12.2 % (ref 12.3–15.4)
GLOBULIN UR ELPH-MCNC: 3.2 GM/DL
GLUCOSE SERPL-MCNC: 75 MG/DL (ref 65–99)
HBA1C MFR BLD: 5.3 % (ref 4.8–5.6)
HCT VFR BLD AUTO: 39.8 % (ref 34–46.6)
HCV AB SER QL: NORMAL
HDLC SERPL-MCNC: 40 MG/DL (ref 40–60)
HGB BLD-MCNC: 13.1 G/DL (ref 12–15.9)
IMM GRANULOCYTES # BLD AUTO: 0.02 10*3/MM3 (ref 0–0.05)
IMM GRANULOCYTES NFR BLD AUTO: 0.3 % (ref 0–0.5)
LDLC SERPL CALC-MCNC: 148 MG/DL (ref 0–100)
LDLC/HDLC SERPL: 3.63 {RATIO}
LYMPHOCYTES # BLD AUTO: 2 10*3/MM3 (ref 0.7–3.1)
LYMPHOCYTES NFR BLD AUTO: 29.6 % (ref 19.6–45.3)
MCH RBC QN AUTO: 27.5 PG (ref 26.6–33)
MCHC RBC AUTO-ENTMCNC: 32.9 G/DL (ref 31.5–35.7)
MCV RBC AUTO: 83.6 FL (ref 79–97)
MONOCYTES # BLD AUTO: 0.64 10*3/MM3 (ref 0.1–0.9)
MONOCYTES NFR BLD AUTO: 9.5 % (ref 5–12)
NEUTROPHILS NFR BLD AUTO: 3.75 10*3/MM3 (ref 1.7–7)
NEUTROPHILS NFR BLD AUTO: 55.6 % (ref 42.7–76)
NRBC BLD AUTO-RTO: 0 /100 WBC (ref 0–0.2)
PLATELET # BLD AUTO: 370 10*3/MM3 (ref 140–450)
PMV BLD AUTO: 9.9 FL (ref 6–12)
POTASSIUM SERPL-SCNC: 4 MMOL/L (ref 3.5–5.2)
PROT SERPL-MCNC: 7.1 G/DL (ref 6–8.5)
RBC # BLD AUTO: 4.76 10*6/MM3 (ref 3.77–5.28)
SODIUM SERPL-SCNC: 140 MMOL/L (ref 136–145)
TRIGL SERPL-MCNC: 159 MG/DL (ref 0–150)
TSH SERPL DL<=0.05 MIU/L-ACNC: 0.47 UIU/ML (ref 0.27–4.2)
VLDLC SERPL-MCNC: 29 MG/DL (ref 5–40)
WBC NRBC COR # BLD AUTO: 6.75 10*3/MM3 (ref 3.4–10.8)

## 2025-04-27 LAB
NCCN CRITERIA FLAG: NORMAL
TYRER CUZICK SCORE: 8.7

## 2025-05-02 ENCOUNTER — TRANSCRIBE ORDERS (OUTPATIENT)
Dept: ADMINISTRATIVE | Facility: HOSPITAL | Age: 40
End: 2025-05-02
Payer: MEDICAID

## 2025-05-02 ENCOUNTER — HOSPITAL ENCOUNTER (OUTPATIENT)
Facility: HOSPITAL | Age: 40
Discharge: HOME | End: 2025-05-02
Payer: MEDICAID

## 2025-05-02 DIAGNOSIS — N64.4 BREAST PAIN, LEFT: ICD-10-CM

## 2025-05-02 DIAGNOSIS — R92.8 ABNORMAL MAMMOGRAM: Primary | ICD-10-CM

## 2025-05-02 PROCEDURE — 76642 ULTRASOUND BREAST LIMITED: CPT

## 2025-05-02 PROCEDURE — G0279 TOMOSYNTHESIS, MAMMO: HCPCS

## 2025-05-02 PROCEDURE — 77066 DX MAMMO INCL CAD BI: CPT

## 2025-05-09 DIAGNOSIS — R07.89 ATYPICAL CHEST PAIN: ICD-10-CM

## 2025-05-16 ENCOUNTER — HOSPITAL ENCOUNTER (OUTPATIENT)
Facility: HOSPITAL | Age: 40
Discharge: HOME | End: 2025-05-16
Payer: MEDICAID

## 2025-05-16 DIAGNOSIS — R92.8 ABNORMAL MAMMOGRAM: ICD-10-CM

## 2025-05-16 PROCEDURE — 25010000002 LIDOCAINE 1 % SOLUTION: Performed by: RADIOLOGY

## 2025-05-16 PROCEDURE — 25010000002 LIDOCAINE 1% - EPINEPHRINE 1:100000 1 %-1:100000 SOLUTION: Performed by: RADIOLOGY

## 2025-05-16 PROCEDURE — A4648 IMPLANTABLE TISSUE MARKER: HCPCS

## 2025-05-16 RX ORDER — LIDOCAINE HYDROCHLORIDE AND EPINEPHRINE 10; 10 MG/ML; UG/ML
5 INJECTION, SOLUTION INFILTRATION; PERINEURAL ONCE
Status: COMPLETED | OUTPATIENT
Start: 2025-05-16 | End: 2025-05-16

## 2025-05-16 RX ORDER — LIDOCAINE HYDROCHLORIDE 10 MG/ML
5 INJECTION, SOLUTION INFILTRATION; PERINEURAL ONCE
Status: COMPLETED | OUTPATIENT
Start: 2025-05-16 | End: 2025-05-16

## 2025-05-16 RX ADMIN — LIDOCAINE HYDROCHLORIDE,EPINEPHRINE BITARTRATE 1 ML: 10; .01 INJECTION, SOLUTION INFILTRATION; PERINEURAL at 15:18

## 2025-05-16 RX ADMIN — LIDOCAINE HYDROCHLORIDE 2 ML: 10 INJECTION, SOLUTION INFILTRATION; PERINEURAL at 15:17

## 2025-05-20 ENCOUNTER — TELEPHONE (OUTPATIENT)
Dept: MAMMOGRAPHY | Facility: HOSPITAL | Age: 40
End: 2025-05-20
Payer: MEDICAID

## 2025-05-20 LAB
CYTO UR: NORMAL
LAB AP CASE REPORT: NORMAL
LAB AP CLINICAL INFORMATION: NORMAL
LAB AP DIAGNOSIS COMMENT: NORMAL
PATH REPORT.FINAL DX SPEC: NORMAL
PATH REPORT.GROSS SPEC: NORMAL

## 2025-06-02 ENCOUNTER — TELEPHONE (OUTPATIENT)
Dept: CARDIOLOGY | Facility: CLINIC | Age: 40
End: 2025-06-02
Payer: MEDICAID

## 2025-06-02 NOTE — TELEPHONE ENCOUNTER
Called patient and offered sooner appt w/ Dr. Murillo on 6/3/25, 6/18/25 and 6/27/25. Patient declined all 3 appointments stating she wished to keep her Sep 2025 appt. Pt has been removed from the waitlist.

## 2025-06-09 DIAGNOSIS — R11.0 NAUSEA: ICD-10-CM

## 2025-06-09 DIAGNOSIS — Z30.41 ENCOUNTER FOR SURVEILLANCE OF CONTRACEPTIVE PILLS: ICD-10-CM

## 2025-06-09 DIAGNOSIS — J30.89 NON-SEASONAL ALLERGIC RHINITIS DUE TO OTHER ALLERGIC TRIGGER: ICD-10-CM

## 2025-06-09 DIAGNOSIS — R12 HEARTBURN: ICD-10-CM

## 2025-06-09 DIAGNOSIS — M62.89 MUSCLE TIGHTNESS: ICD-10-CM

## 2025-06-09 RX ORDER — METHOCARBAMOL 500 MG/1
500 TABLET, FILM COATED ORAL
Qty: 30 TABLET | Refills: 2 | Status: SHIPPED | OUTPATIENT
Start: 2025-06-09

## 2025-06-09 RX ORDER — FAMOTIDINE 40 MG/1
40 TABLET, FILM COATED ORAL 2 TIMES DAILY
Qty: 60 TABLET | Refills: 11 | Status: SHIPPED | OUTPATIENT
Start: 2025-06-09

## 2025-06-09 RX ORDER — ONDANSETRON 8 MG/1
TABLET, ORALLY DISINTEGRATING ORAL
Qty: 30 TABLET | Refills: 1 | Status: SHIPPED | OUTPATIENT
Start: 2025-06-09

## 2025-06-09 RX ORDER — FLUTICASONE PROPIONATE 50 MCG
2 SPRAY, SUSPENSION (ML) NASAL DAILY
Qty: 16 G | Refills: 11 | Status: SHIPPED | OUTPATIENT
Start: 2025-06-09

## 2025-06-09 RX ORDER — MONTELUKAST SODIUM 10 MG/1
10 TABLET ORAL NIGHTLY
Qty: 30 TABLET | Refills: 11 | Status: SHIPPED | OUTPATIENT
Start: 2025-06-09

## 2025-06-09 RX ORDER — ASPIRIN 81 MG/1
81 TABLET ORAL DAILY
Qty: 30 TABLET | Refills: 6 | OUTPATIENT
Start: 2025-06-09

## 2025-06-09 RX ORDER — LEVOCETIRIZINE DIHYDROCHLORIDE 5 MG/1
5 TABLET, FILM COATED ORAL EVERY EVENING
Qty: 30 TABLET | Refills: 11 | Status: SHIPPED | OUTPATIENT
Start: 2025-06-09

## 2025-06-09 RX ORDER — PANTOPRAZOLE SODIUM 40 MG/1
40 TABLET, DELAYED RELEASE ORAL DAILY
Qty: 30 TABLET | Refills: 11 | Status: SHIPPED | OUTPATIENT
Start: 2025-06-09

## 2025-06-09 RX ORDER — NITROGLYCERIN 0.4 MG/1
TABLET SUBLINGUAL
Qty: 30 TABLET | Refills: 1 | OUTPATIENT
Start: 2025-06-09

## 2025-06-09 NOTE — TELEPHONE ENCOUNTER
Pt has not been seen by Dr. Murillo since Sept. 2022. Does have appt scheduled for Sept. 2025. Needs to have meds refilled by pcp until seen by Dr. Murillo.

## 2025-06-13 ENCOUNTER — OFFICE VISIT (OUTPATIENT)
Dept: OBSTETRICS AND GYNECOLOGY | Facility: CLINIC | Age: 40
End: 2025-06-13
Payer: MEDICAID

## 2025-06-13 VITALS
WEIGHT: 152 LBS | DIASTOLIC BLOOD PRESSURE: 74 MMHG | BODY MASS INDEX: 25.29 KG/M2 | RESPIRATION RATE: 14 BRPM | SYSTOLIC BLOOD PRESSURE: 116 MMHG

## 2025-06-13 DIAGNOSIS — N92.6 IRREGULAR MENSES: ICD-10-CM

## 2025-06-13 DIAGNOSIS — Z01.419 WELL WOMAN EXAM: Primary | ICD-10-CM

## 2025-06-13 DIAGNOSIS — Z71.85 VACCINE COUNSELING: ICD-10-CM

## 2025-06-13 RX ORDER — EPINEPHRINE 0.3 MG/.3ML
INJECTION SUBCUTANEOUS
COMMUNITY
Start: 2025-05-20

## 2025-06-13 NOTE — PROGRESS NOTES
Subjective   Chief Complaint   Patient presents with    Gynecologic Exam     Rahel Ibarra is a 39 y.o. year old  presenting to be seen for her annual exam.  They are considering getting pregnant in the coming year.  Continues to have issues with unpredictable menses as well as some right sided pain at times.  There is been no workup done thus far.    SEXUAL Hx:  She is currently sexually active.  In the past year there there has been NO new sexual partners.    Condoms are never used.  She would not like to be screened for STD's at today's exam.  Current birth control method: OCP (estrogen/progesterone).  HEALTH Hx:  She exercises regularly: no (and has no plans to become more active).  She wears her seat belt: yes.  She has concerns about domestic violence: no.    The following portions of the patient's history were reviewed and updated as appropriate:problem list, current medications, allergies, past family history, past medical history, past social history, and past surgical history.    Social History    Tobacco Use      Smoking status: Never      Smokeless tobacco: Never    Review of Systems  Constitutional POS: nothing reported    NEG: anorexia or night sweats   Genitourinary POS: nothing reported    NEG: dysuria or hematuria      Gastointestinal POS: nothing reported    NEG: bloating, change in bowel habits, melena, or reflux symptoms   Integument POS: nothing reported    NEG: moles that are changing in size, shape, color or rashes   Breast POS: nothing reported    NEG: persistent breast lump, skin dimpling, or nipple discharge        Objective   /74   Resp 14   Wt 68.9 kg (152 lb)   LMP 2025 (Approximate)   BMI 25.29 kg/m²     General:  well developed; well nourished  no acute distress  mentation appropriate   Skin:  No suspicious lesions seen   Thyroid: normal to inspection and palpation   Breasts:  Examined in supine position  Symmetric without masses or skin dimpling  Nipples normal  without inversion, lesions or discharge  There are no palpable axillary nodes   Abdomen: soft, non-tender; no masses  no umbilical or inguinal hernias are present  no hepato-splenomegaly   Pelvis: Clinical staff was present for exam  External genitalia:  normal appearance of the external genitalia including Bartholin's and [place]'s glands.  :  urethral meatus normal; urethra normal:  Vaginal:  normal pink mucosa without prolapse or lesions.  Cervix:  normal appearance.  Uterus:  symmetrically enlarged, consisent with 10 weeks size;  Adnexa:  non palpable bilaterally.  Rectal:  digital rectal exam not performed; anus visually normal appearing.        Assessment   Asymptomatic uterine fibroids  Irregular menses on low-dose birth control pill  Right sided pain  Potentially preconceptional in the coming     Plan   Pap was done today.  If she does not receive the results of the Pap within 2 weeks  time, she was instructed to call to find out the results.  I explained to Rahel that the recommendations for Pap smear interval in a low risk patient's has lengthened to 3 years time.  I encouraged her to be seen yearly for a full physical exam including breast and pelvic exam even during the off years when PAP's will not be performed.  Ultrasound needs to be done after her next menses.  She unfortunately is traveling for the next 2 months and although it is the wrong phase of the cycle to do her imaging I think we need to get her ultrasound done today  I have counseled the patient on the importance of staying up to date with preventive vaccines as well as the risks and benefits of these vaccines.  Her vaccine record was reviewed and updated.  The importance of keeping all planned follow-up and taking all medications as prescribed was emphasized.  Follow up at the time of ultrasound to further discuss     No orders of the defined types were placed in this encounter.         This note was electronically signed.    Jewel BARCENAS  HEBER Gutierrez.  June 13, 2025    Part of this note may be an electronic transcription/translation of spoken language to printed text using the Dragon Dictation System.

## 2025-06-16 LAB — REF LAB TEST METHOD: NORMAL

## 2025-06-18 ENCOUNTER — TELEPHONE (OUTPATIENT)
Dept: OBSTETRICS AND GYNECOLOGY | Facility: CLINIC | Age: 40
End: 2025-06-18
Payer: MEDICAID

## 2025-06-18 NOTE — TELEPHONE ENCOUNTER
Please call patient and let her know that other than the uterine fibroid her ultrasound is normal.  I do not think the fibroid is contributing to her pain or her bleeding.  It is not in a location that she should impair pregnancy

## 2025-06-18 NOTE — TELEPHONE ENCOUNTER
Pt informed and stated understanding.  Pt stated that she would like to know what she can do for the pain and irregular cycles?    Please advise.

## 2025-06-19 NOTE — TELEPHONE ENCOUNTER
Birth control pills would be the simplest option for both pain and a regular cycles.  If that is not an option she would consider, we should probably see her and talk about choices beyond that.